# Patient Record
Sex: MALE | Race: WHITE
[De-identification: names, ages, dates, MRNs, and addresses within clinical notes are randomized per-mention and may not be internally consistent; named-entity substitution may affect disease eponyms.]

---

## 2018-09-17 PROBLEM — Z00.00 ENCOUNTER FOR PREVENTIVE HEALTH EXAMINATION: Status: ACTIVE | Noted: 2018-09-17

## 2018-09-19 ENCOUNTER — APPOINTMENT (OUTPATIENT)
Dept: VASCULAR SURGERY | Facility: CLINIC | Age: 68
End: 2018-09-19
Payer: MEDICARE

## 2018-09-19 VITALS — BODY MASS INDEX: 27.83 KG/M2 | WEIGHT: 210 LBS | HEIGHT: 73 IN

## 2018-09-19 VITALS — OXYGEN SATURATION: 98 % | HEART RATE: 60 BPM | DIASTOLIC BLOOD PRESSURE: 68 MMHG | SYSTOLIC BLOOD PRESSURE: 152 MMHG

## 2018-09-19 DIAGNOSIS — Z86.39 PERSONAL HISTORY OF OTHER ENDOCRINE, NUTRITIONAL AND METABOLIC DISEASE: ICD-10-CM

## 2018-09-19 DIAGNOSIS — M79.606 PAIN IN LEG, UNSPECIFIED: ICD-10-CM

## 2018-09-19 DIAGNOSIS — Z86.79 PERSONAL HISTORY OF OTHER DISEASES OF THE CIRCULATORY SYSTEM: ICD-10-CM

## 2018-09-19 DIAGNOSIS — Z87.448 PERSONAL HISTORY OF OTHER DISEASES OF URINARY SYSTEM: ICD-10-CM

## 2018-09-19 PROCEDURE — 93923 UPR/LXTR ART STDY 3+ LVLS: CPT

## 2018-09-19 PROCEDURE — 99204 OFFICE O/P NEW MOD 45 MIN: CPT

## 2018-09-20 RX ORDER — INSULIN DETEMIR 100 [IU]/ML
100 INJECTION, SOLUTION SUBCUTANEOUS
Refills: 0 | Status: ACTIVE | COMMUNITY

## 2018-09-20 RX ORDER — ATORVASTATIN CALCIUM 20 MG/1
20 TABLET, FILM COATED ORAL
Refills: 0 | Status: ACTIVE | COMMUNITY

## 2018-09-20 RX ORDER — POLYETHYLENE GLYCOL-3350 AND ELECTROLYTES 236; 6.74; 5.86; 2.97; 22.74 G/274.31G; G/274.31G; G/274.31G; G/274.31G; G/274.31G
236 POWDER, FOR SOLUTION ORAL
Qty: 4000 | Refills: 0 | Status: ACTIVE | COMMUNITY
Start: 2018-09-12

## 2018-09-20 RX ORDER — ASPIRIN 81 MG
81 TABLET, DELAYED RELEASE (ENTERIC COATED) ORAL
Refills: 0 | Status: ACTIVE | COMMUNITY

## 2018-09-20 RX ORDER — PRAZOSIN HYDROCHLORIDE 2 MG/1
2 CAPSULE ORAL
Qty: 30 | Refills: 0 | Status: ACTIVE | COMMUNITY
Start: 2018-07-11

## 2018-09-20 RX ORDER — AMLODIPINE BESYLATE 10 MG/1
10 TABLET ORAL
Refills: 0 | Status: ACTIVE | COMMUNITY

## 2018-09-20 RX ORDER — APIXABAN 5 MG/1
TABLET, FILM COATED ORAL
Refills: 0 | Status: ACTIVE | COMMUNITY

## 2018-09-20 RX ORDER — CARVEDILOL 25 MG/1
25 TABLET, FILM COATED ORAL
Refills: 0 | Status: ACTIVE | COMMUNITY

## 2018-11-14 ENCOUNTER — APPOINTMENT (OUTPATIENT)
Dept: VASCULAR SURGERY | Facility: CLINIC | Age: 68
End: 2018-11-14

## 2019-11-07 ENCOUNTER — OUTPATIENT (OUTPATIENT)
Dept: OUTPATIENT SERVICES | Facility: HOSPITAL | Age: 69
LOS: 1 days | Discharge: HOME | End: 2019-11-07

## 2019-11-08 DIAGNOSIS — E11.9 TYPE 2 DIABETES MELLITUS WITHOUT COMPLICATIONS: ICD-10-CM

## 2019-11-08 DIAGNOSIS — N18.5 CHRONIC KIDNEY DISEASE, STAGE 5: ICD-10-CM

## 2019-11-08 DIAGNOSIS — I10 ESSENTIAL (PRIMARY) HYPERTENSION: ICD-10-CM

## 2021-07-28 ENCOUNTER — APPOINTMENT (OUTPATIENT)
Dept: CARDIOLOGY | Facility: CLINIC | Age: 71
End: 2021-07-28

## 2023-07-12 ENCOUNTER — APPOINTMENT (OUTPATIENT)
Dept: VASCULAR SURGERY | Facility: CLINIC | Age: 73
End: 2023-07-12

## 2024-04-09 ENCOUNTER — INPATIENT (INPATIENT)
Facility: HOSPITAL | Age: 74
LOS: 2 days | Discharge: HOME HEALTH | DRG: 629 | End: 2024-04-12
Attending: INTERNAL MEDICINE | Admitting: HOSPITALIST
Payer: MEDICARE

## 2024-04-09 VITALS
TEMPERATURE: 97 F | SYSTOLIC BLOOD PRESSURE: 134 MMHG | RESPIRATION RATE: 20 BRPM | HEART RATE: 96 BPM | WEIGHT: 210.1 LBS | DIASTOLIC BLOOD PRESSURE: 68 MMHG | OXYGEN SATURATION: 97 %

## 2024-04-09 DIAGNOSIS — Z95.1 PRESENCE OF AORTOCORONARY BYPASS GRAFT: Chronic | ICD-10-CM

## 2024-04-09 DIAGNOSIS — S91.309A UNSPECIFIED OPEN WOUND, UNSPECIFIED FOOT, INITIAL ENCOUNTER: ICD-10-CM

## 2024-04-09 DIAGNOSIS — Z95.810 PRESENCE OF AUTOMATIC (IMPLANTABLE) CARDIAC DEFIBRILLATOR: Chronic | ICD-10-CM

## 2024-04-09 DIAGNOSIS — Z94.0 KIDNEY TRANSPLANT STATUS: Chronic | ICD-10-CM

## 2024-04-09 LAB
ALBUMIN SERPL ELPH-MCNC: 4.3 G/DL — SIGNIFICANT CHANGE UP (ref 3.5–5.2)
ALP SERPL-CCNC: 89 U/L — SIGNIFICANT CHANGE UP (ref 30–115)
ALT FLD-CCNC: 14 U/L — SIGNIFICANT CHANGE UP (ref 0–41)
ANION GAP SERPL CALC-SCNC: 12 MMOL/L — SIGNIFICANT CHANGE UP (ref 7–14)
APTT BLD: 37.2 SEC — SIGNIFICANT CHANGE UP (ref 27–39.2)
AST SERPL-CCNC: 20 U/L — SIGNIFICANT CHANGE UP (ref 0–41)
BASOPHILS # BLD AUTO: 0.02 K/UL — SIGNIFICANT CHANGE UP (ref 0–0.2)
BASOPHILS NFR BLD AUTO: 0.1 % — SIGNIFICANT CHANGE UP (ref 0–1)
BILIRUB SERPL-MCNC: 0.3 MG/DL — SIGNIFICANT CHANGE UP (ref 0.2–1.2)
BLD GP AB SCN SERPL QL: SIGNIFICANT CHANGE UP
BUN SERPL-MCNC: 19 MG/DL — SIGNIFICANT CHANGE UP (ref 10–20)
CALCIUM SERPL-MCNC: 9.2 MG/DL — SIGNIFICANT CHANGE UP (ref 8.4–10.5)
CHLORIDE SERPL-SCNC: 103 MMOL/L — SIGNIFICANT CHANGE UP (ref 98–110)
CO2 SERPL-SCNC: 23 MMOL/L — SIGNIFICANT CHANGE UP (ref 17–32)
CREAT SERPL-MCNC: 1.2 MG/DL — SIGNIFICANT CHANGE UP (ref 0.7–1.5)
EGFR: 65 ML/MIN/1.73M2 — SIGNIFICANT CHANGE UP
EOSINOPHIL # BLD AUTO: 0.11 K/UL — SIGNIFICANT CHANGE UP (ref 0–0.7)
EOSINOPHIL NFR BLD AUTO: 0.7 % — SIGNIFICANT CHANGE UP (ref 0–8)
GLUCOSE SERPL-MCNC: 213 MG/DL — HIGH (ref 70–99)
HCT VFR BLD CALC: 43.3 % — SIGNIFICANT CHANGE UP (ref 42–52)
HGB BLD-MCNC: 13.3 G/DL — LOW (ref 14–18)
IMM GRANULOCYTES NFR BLD AUTO: 0.3 % — SIGNIFICANT CHANGE UP (ref 0.1–0.3)
INR BLD: 1 RATIO — SIGNIFICANT CHANGE UP (ref 0.65–1.3)
LYMPHOCYTES # BLD AUTO: 18.8 % — LOW (ref 20.5–51.1)
LYMPHOCYTES # BLD AUTO: 2.77 K/UL — SIGNIFICANT CHANGE UP (ref 1.2–3.4)
MCHC RBC-ENTMCNC: 23.9 PG — LOW (ref 27–31)
MCHC RBC-ENTMCNC: 30.7 G/DL — LOW (ref 32–37)
MCV RBC AUTO: 77.7 FL — LOW (ref 80–94)
MONOCYTES # BLD AUTO: 0.82 K/UL — HIGH (ref 0.1–0.6)
MONOCYTES NFR BLD AUTO: 5.6 % — SIGNIFICANT CHANGE UP (ref 1.7–9.3)
NEUTROPHILS # BLD AUTO: 10.97 K/UL — HIGH (ref 1.4–6.5)
NEUTROPHILS NFR BLD AUTO: 74.5 % — SIGNIFICANT CHANGE UP (ref 42.2–75.2)
NRBC # BLD: 0 /100 WBCS — SIGNIFICANT CHANGE UP (ref 0–0)
PLATELET # BLD AUTO: 250 K/UL — SIGNIFICANT CHANGE UP (ref 130–400)
PMV BLD: 10 FL — SIGNIFICANT CHANGE UP (ref 7.4–10.4)
POTASSIUM SERPL-MCNC: 4.9 MMOL/L — SIGNIFICANT CHANGE UP (ref 3.5–5)
POTASSIUM SERPL-SCNC: 4.9 MMOL/L — SIGNIFICANT CHANGE UP (ref 3.5–5)
PROT SERPL-MCNC: 6.7 G/DL — SIGNIFICANT CHANGE UP (ref 6–8)
PROTHROM AB SERPL-ACNC: 11.4 SEC — SIGNIFICANT CHANGE UP (ref 9.95–12.87)
RBC # BLD: 5.57 M/UL — SIGNIFICANT CHANGE UP (ref 4.7–6.1)
RBC # FLD: 18.9 % — HIGH (ref 11.5–14.5)
SODIUM SERPL-SCNC: 138 MMOL/L — SIGNIFICANT CHANGE UP (ref 135–146)
WBC # BLD: 14.74 K/UL — HIGH (ref 4.8–10.8)
WBC # FLD AUTO: 14.74 K/UL — HIGH (ref 4.8–10.8)

## 2024-04-09 PROCEDURE — 97162 PT EVAL MOD COMPLEX 30 MIN: CPT | Mod: GP

## 2024-04-09 PROCEDURE — 73630 X-RAY EXAM OF FOOT: CPT | Mod: 26,LT

## 2024-04-09 PROCEDURE — 93925 LOWER EXTREMITY STUDY: CPT

## 2024-04-09 PROCEDURE — 99222 1ST HOSP IP/OBS MODERATE 55: CPT

## 2024-04-09 PROCEDURE — 85025 COMPLETE CBC W/AUTO DIFF WBC: CPT

## 2024-04-09 PROCEDURE — 87070 CULTURE OTHR SPECIMN AEROBIC: CPT

## 2024-04-09 PROCEDURE — 71045 X-RAY EXAM CHEST 1 VIEW: CPT | Mod: 26

## 2024-04-09 PROCEDURE — 87077 CULTURE AEROBIC IDENTIFY: CPT

## 2024-04-09 PROCEDURE — 88307 TISSUE EXAM BY PATHOLOGIST: CPT

## 2024-04-09 PROCEDURE — 82962 GLUCOSE BLOOD TEST: CPT

## 2024-04-09 PROCEDURE — 85652 RBC SED RATE AUTOMATED: CPT

## 2024-04-09 PROCEDURE — 86140 C-REACTIVE PROTEIN: CPT

## 2024-04-09 PROCEDURE — 36415 COLL VENOUS BLD VENIPUNCTURE: CPT

## 2024-04-09 PROCEDURE — 87075 CULTR BACTERIA EXCEPT BLOOD: CPT

## 2024-04-09 PROCEDURE — 87186 SC STD MICRODIL/AGAR DIL: CPT

## 2024-04-09 PROCEDURE — 93925 LOWER EXTREMITY STUDY: CPT | Mod: 26

## 2024-04-09 PROCEDURE — 80048 BASIC METABOLIC PNL TOTAL CA: CPT

## 2024-04-09 PROCEDURE — 99285 EMERGENCY DEPT VISIT HI MDM: CPT

## 2024-04-09 PROCEDURE — 93010 ELECTROCARDIOGRAM REPORT: CPT

## 2024-04-09 PROCEDURE — 88311 DECALCIFY TISSUE: CPT

## 2024-04-09 RX ORDER — METOPROLOL TARTRATE 50 MG
50 TABLET ORAL DAILY
Refills: 0 | Status: DISCONTINUED | OUTPATIENT
Start: 2024-04-09 | End: 2024-04-10

## 2024-04-09 RX ORDER — LANOLIN ALCOHOL/MO/W.PET/CERES
3 CREAM (GRAM) TOPICAL AT BEDTIME
Refills: 0 | Status: DISCONTINUED | OUTPATIENT
Start: 2024-04-09 | End: 2024-04-10

## 2024-04-09 RX ORDER — SODIUM CHLORIDE 9 MG/ML
1000 INJECTION, SOLUTION INTRAVENOUS
Refills: 0 | Status: DISCONTINUED | OUTPATIENT
Start: 2024-04-09 | End: 2024-04-10

## 2024-04-09 RX ORDER — INSULIN LISPRO 100/ML
VIAL (ML) SUBCUTANEOUS
Refills: 0 | Status: DISCONTINUED | OUTPATIENT
Start: 2024-04-09 | End: 2024-04-10

## 2024-04-09 RX ORDER — DEXTROSE 50 % IN WATER 50 %
25 SYRINGE (ML) INTRAVENOUS ONCE
Refills: 0 | Status: DISCONTINUED | OUTPATIENT
Start: 2024-04-09 | End: 2024-04-10

## 2024-04-09 RX ORDER — RANOLAZINE 500 MG/1
1 TABLET, FILM COATED, EXTENDED RELEASE ORAL
Refills: 0 | DISCHARGE

## 2024-04-09 RX ORDER — RANOLAZINE 500 MG/1
500 TABLET, FILM COATED, EXTENDED RELEASE ORAL
Refills: 0 | Status: DISCONTINUED | OUTPATIENT
Start: 2024-04-09 | End: 2024-04-10

## 2024-04-09 RX ORDER — ATORVASTATIN CALCIUM 80 MG/1
20 TABLET, FILM COATED ORAL AT BEDTIME
Refills: 0 | Status: DISCONTINUED | OUTPATIENT
Start: 2024-04-09 | End: 2024-04-10

## 2024-04-09 RX ORDER — GLUCAGON INJECTION, SOLUTION 0.5 MG/.1ML
1 INJECTION, SOLUTION SUBCUTANEOUS ONCE
Refills: 0 | Status: DISCONTINUED | OUTPATIENT
Start: 2024-04-09 | End: 2024-04-10

## 2024-04-09 RX ORDER — MYCOPHENOLATE MOFETIL 250 MG/1
500 CAPSULE ORAL
Refills: 0 | Status: DISCONTINUED | OUTPATIENT
Start: 2024-04-09 | End: 2024-04-10

## 2024-04-09 RX ORDER — ISOSORBIDE MONONITRATE 60 MG/1
30 TABLET, EXTENDED RELEASE ORAL DAILY
Refills: 0 | Status: DISCONTINUED | OUTPATIENT
Start: 2024-04-09 | End: 2024-04-10

## 2024-04-09 RX ORDER — DEXTROSE 10 % IN WATER 10 %
125 INTRAVENOUS SOLUTION INTRAVENOUS ONCE
Refills: 0 | Status: DISCONTINUED | OUTPATIENT
Start: 2024-04-09 | End: 2024-04-10

## 2024-04-09 RX ORDER — FOLIC ACID 0.8 MG
1 TABLET ORAL DAILY
Refills: 0 | Status: DISCONTINUED | OUTPATIENT
Start: 2024-04-09 | End: 2024-04-10

## 2024-04-09 RX ORDER — DEXTROSE 50 % IN WATER 50 %
12.5 SYRINGE (ML) INTRAVENOUS ONCE
Refills: 0 | Status: DISCONTINUED | OUTPATIENT
Start: 2024-04-09 | End: 2024-04-10

## 2024-04-09 RX ORDER — INSULIN GLARGINE 100 [IU]/ML
30 INJECTION, SOLUTION SUBCUTANEOUS
Refills: 0 | DISCHARGE

## 2024-04-09 RX ORDER — CLOPIDOGREL BISULFATE 75 MG/1
0 TABLET, FILM COATED ORAL
Qty: 0 | Refills: 3 | DISCHARGE

## 2024-04-09 RX ORDER — MYCOPHENOLATE MOFETIL 250 MG/1
1 CAPSULE ORAL
Refills: 0 | DISCHARGE

## 2024-04-09 RX ORDER — INFLUENZA VIRUS VACCINE 15; 15; 15; 15 UG/.5ML; UG/.5ML; UG/.5ML; UG/.5ML
0.7 SUSPENSION INTRAMUSCULAR ONCE
Refills: 0 | Status: DISCONTINUED | OUTPATIENT
Start: 2024-04-09 | End: 2024-04-12

## 2024-04-09 RX ORDER — NIFEDIPINE 30 MG
1 TABLET, EXTENDED RELEASE 24 HR ORAL
Refills: 0 | DISCHARGE

## 2024-04-09 RX ORDER — ISOSORBIDE MONONITRATE 60 MG/1
1 TABLET, EXTENDED RELEASE ORAL
Refills: 0 | DISCHARGE

## 2024-04-09 RX ORDER — SERTRALINE 25 MG/1
50 TABLET, FILM COATED ORAL DAILY
Refills: 0 | Status: DISCONTINUED | OUTPATIENT
Start: 2024-04-09 | End: 2024-04-10

## 2024-04-09 RX ORDER — TACROLIMUS 5 MG/1
1 CAPSULE ORAL
Refills: 0 | Status: DISCONTINUED | OUTPATIENT
Start: 2024-04-09 | End: 2024-04-10

## 2024-04-09 RX ORDER — PREDNISOLONE 5 MG
1 TABLET ORAL
Refills: 0 | DISCHARGE

## 2024-04-09 RX ORDER — APIXABAN 2.5 MG/1
5 TABLET, FILM COATED ORAL EVERY 12 HOURS
Refills: 0 | Status: DISCONTINUED | OUTPATIENT
Start: 2024-04-09 | End: 2024-04-09

## 2024-04-09 RX ORDER — TACROLIMUS 5 MG/1
1 CAPSULE ORAL
Refills: 0 | DISCHARGE

## 2024-04-09 RX ORDER — SODIUM CHLORIDE 9 MG/ML
1000 INJECTION, SOLUTION INTRAVENOUS
Refills: 0 | Status: DISCONTINUED | OUTPATIENT
Start: 2024-04-09 | End: 2024-04-11

## 2024-04-09 RX ORDER — FAMOTIDINE 10 MG/ML
20 INJECTION INTRAVENOUS ONCE
Refills: 0 | Status: COMPLETED | OUTPATIENT
Start: 2024-04-09 | End: 2024-04-09

## 2024-04-09 RX ORDER — ISOSORBIDE DINITRATE 5 MG/1
1 TABLET ORAL
Refills: 0 | DISCHARGE

## 2024-04-09 RX ORDER — ASPIRIN/CALCIUM CARB/MAGNESIUM 324 MG
81 TABLET ORAL DAILY
Refills: 0 | Status: DISCONTINUED | OUTPATIENT
Start: 2024-04-09 | End: 2024-04-10

## 2024-04-09 RX ORDER — INSULIN GLARGINE 100 [IU]/ML
30 INJECTION, SOLUTION SUBCUTANEOUS AT BEDTIME
Refills: 0 | Status: DISCONTINUED | OUTPATIENT
Start: 2024-04-09 | End: 2024-04-10

## 2024-04-09 RX ORDER — NIFEDIPINE 30 MG
30 TABLET, EXTENDED RELEASE 24 HR ORAL
Refills: 0 | Status: DISCONTINUED | OUTPATIENT
Start: 2024-04-09 | End: 2024-04-10

## 2024-04-09 RX ORDER — DAPAGLIFLOZIN 10 MG/1
0 TABLET, FILM COATED ORAL
Qty: 0 | Refills: 0 | DISCHARGE

## 2024-04-09 RX ORDER — DEXTROSE 50 % IN WATER 50 %
15 SYRINGE (ML) INTRAVENOUS ONCE
Refills: 0 | Status: DISCONTINUED | OUTPATIENT
Start: 2024-04-09 | End: 2024-04-10

## 2024-04-09 RX ORDER — INSULIN LISPRO 100/ML
VIAL (ML) SUBCUTANEOUS AT BEDTIME
Refills: 0 | Status: DISCONTINUED | OUTPATIENT
Start: 2024-04-09 | End: 2024-04-10

## 2024-04-09 RX ORDER — FOLIC ACID 0.8 MG
1 TABLET ORAL
Refills: 0 | DISCHARGE

## 2024-04-09 RX ORDER — DAPAGLIFLOZIN 10 MG/1
10 TABLET, FILM COATED ORAL EVERY 24 HOURS
Refills: 0 | Status: DISCONTINUED | OUTPATIENT
Start: 2024-04-09 | End: 2024-04-10

## 2024-04-09 RX ADMIN — ATORVASTATIN CALCIUM 20 MILLIGRAM(S): 80 TABLET, FILM COATED ORAL at 21:51

## 2024-04-09 RX ADMIN — INSULIN GLARGINE 30 UNIT(S): 100 INJECTION, SOLUTION SUBCUTANEOUS at 22:27

## 2024-04-09 RX ADMIN — TACROLIMUS 1 MILLIGRAM(S): 5 CAPSULE ORAL at 17:40

## 2024-04-09 RX ADMIN — Medication 1 TABLET(S): at 17:40

## 2024-04-09 RX ADMIN — FAMOTIDINE 20 MILLIGRAM(S): 10 INJECTION INTRAVENOUS at 08:51

## 2024-04-09 RX ADMIN — Medication 30 MILLIGRAM(S): at 17:40

## 2024-04-09 RX ADMIN — Medication 2: at 17:58

## 2024-04-09 RX ADMIN — DAPAGLIFLOZIN 10 MILLIGRAM(S): 10 TABLET, FILM COATED ORAL at 17:40

## 2024-04-09 RX ADMIN — Medication 3 MILLIGRAM(S): at 21:51

## 2024-04-09 RX ADMIN — RANOLAZINE 500 MILLIGRAM(S): 500 TABLET, FILM COATED, EXTENDED RELEASE ORAL at 17:39

## 2024-04-09 RX ADMIN — MYCOPHENOLATE MOFETIL 500 MILLIGRAM(S): 250 CAPSULE ORAL at 17:39

## 2024-04-09 NOTE — ED PROVIDER NOTE - PHYSICAL EXAMINATION
VITAL SIGNS: I have reviewed nursing notes and confirm.  CONSTITUTIONAL: well-appearing, non-toxic, NAD  SKIN: Warm dry, normal skin turgor  HEAD: NCAT  EYES: EOMI, PERRLA, no scleral icterus  ENT: Moist mucous membranes, normal pharynx with no erythema or exudates  NECK: Supple; non tender. Full ROM. No cervical LAD  CARD: RRR, no murmurs, rubs or gallops  RESP: clear to ausculation b/l.  No rales, rhonchi, or wheezing.  ABD: soft, + BS, non-tender, non-distended, no rebound or guarding. No CVA tenderness  EXT: Full ROM, no bony tenderness, left big toe with partial amputation, erythema and edema present with slight expressable purulent discharge, Pulses intact bilateral UE and LE, no pedal edema, no calf tenderness  NEURO: normal motor. normal sensory. CN II-XII intact. Cerebellar testing normal. Normal gait.  PSYCH: Cooperative, appropriate.

## 2024-04-09 NOTE — CONSULT NOTE ADULT - SUBJECTIVE AND OBJECTIVE BOX
Podiatry Consult Note    Subjective:  NEMO ALLEN is a pleasant well-nourished, well developed 73y Male in no acute distress, alert awake, and oriented to person, place and time.   Patient is a 73y old  Male who presents with a chief complaint of L big toe redness & swelling since December 2023. Went to [facility] in [place], was given IV abx and DSC with PO abx.  States redness/drainage comes and goes. States wife drained a lot of pus from toe yesterday.  Saw Dr. Dumont as o/p yesterday who told pt to come to ED this AM for admission and possible surgery tonight.  Reports mild pain to L big toe. Denies N/V/F/C. No other pedal complaints.   HPI:    PAST MEDICAL & SURGICAL HISTORY:    Objective:  Vital Signs Last 24 Hrs  T(C): 36 (09 Apr 2024 07:55), Max: 36 (09 Apr 2024 07:55)  T(F): 96.8 (09 Apr 2024 07:55), Max: 96.8 (09 Apr 2024 07:55)  HR: 96 (09 Apr 2024 07:55) (96 - 96)  BP: 134/68 (09 Apr 2024 07:55) (134/68 - 134/68)  BP(mean): --  RR: 20 (09 Apr 2024 07:55) (20 - 20)  SpO2: 97% (09 Apr 2024 07:55) (97% - 97%)    Parameters below as of 09 Apr 2024 07:55  Patient On (Oxygen Delivery Method): room air                            13.3   14.74 )-----------( 250      ( 09 Apr 2024 08:10 )             43.3                           Physical Exam - Lower Extremity Focused:   #Left   Derm:        Podiatry Consult Note    Subjective:  NEMO ALLEN is a pleasant well-nourished, well developed 73y Male in no acute distress, alert awake, and oriented to person, place and time.   Patient is a 73y old  Male who presents with a chief complaint of L big toe redness & swelling since December 2023. Went to [facility] in [place], was given IV abx and DSC with PO abx.  States redness/drainage comes and goes. States wife drained a lot of pus from toe yesterday.  Saw Dr. Dumont as o/p yesterday who told pt to come to ED this AM for admission and possible surgery tonight.  Reports mild pain to L big toe. Denies N/V/F/C. No other pedal complaints.   HPI:    PAST MEDICAL & SURGICAL HISTORY:    Objective:  Vital Signs Last 24 Hrs  T(C): 36 (09 Apr 2024 07:55), Max: 36 (09 Apr 2024 07:55)  T(F): 96.8 (09 Apr 2024 07:55), Max: 96.8 (09 Apr 2024 07:55)  HR: 96 (09 Apr 2024 07:55) (96 - 96)  BP: 134/68 (09 Apr 2024 07:55) (134/68 - 134/68)  BP(mean): --  RR: 20 (09 Apr 2024 07:55) (20 - 20)  SpO2: 97% (09 Apr 2024 07:55) (97% - 97%)    Parameters below as of 09 Apr 2024 07:55  Patient On (Oxygen Delivery Method): room air                            13.3   14.74 )-----------( 250      ( 09 Apr 2024 08:10 )             43.3                           Physical Exam - Lower Extremity Focused:   #Left LE  Derm:   Open distal hallux wound    -Wound Probes skin w/ no active drainage/bleeding   -Mild Cellulitis w/ Erythema of Left hallux   Toe is warm, capillary refill is instant to the toes     Vascular: DP and PT Pulses Diminished; Foot is Warm to Warm to the touch   Neuro: Protective Sensation Diminished / Moderately Neuropathic   MSK: Partial hallux amputation. 2nd toe amputation. Pain On Palpation at Wound Site. 5/5 MMT all cpts.      Assessment:  Left Hallux abscess   Left Hallux osteomyelitis   Cellulitic Left Lower Extremity     Plan:  Chart reviewed and Patient evaluated. All Questions and Concerns Addressed and Answered  Discussed diagnosis and treatment with patient  Wound Flushed w/ NS; Wound dressed w/ Xeroform / DSD / Kerlix   Local Wound Care; As Stated Above   No Wound Culture Obtained; No appreciable drainage bedside  XR Imaging Left Foot; Performed; f/u report;   Lower Extremity Arterial Duplex B/L;   ESR/CRP;   Request ID Consult; WBC 14.74 / Follow Up w/ OR Cx/Path  OR as Add-on #4 tonight 4/9; Excisional debridement of soft tissue & bone, Left foot  *** Request Medical Clearance ***  NPO STAT  Request pre-lab optimization & OR stratification prior to sx  Discussed Plan w/ Dr. Dumont    Podiatry            Podiatry Consult Note    Subjective:  NEMO ALLEN is a pleasant well-nourished, well developed 73y Male in no acute distress, alert awake, and oriented to person, place and time.   Patient is a 73y old  Male who presents with a chief complaint of L big toe redness & swelling since December 2023. Went to [facility] in [place], was given IV abx and DSC with PO abx.  States redness/drainage comes and goes. States wife drained a lot of pus from toe yesterday.  Saw Dr. Dumont as o/p yesterday who told pt to come to ED this AM for admission and possible surgery tonight.  Reports mild pain to L big toe. Denies N/V/F/C. No other pedal complaints.   HPI:    PAST MEDICAL & SURGICAL HISTORY:    Objective:  Vital Signs Last 24 Hrs  T(C): 36 (09 Apr 2024 07:55), Max: 36 (09 Apr 2024 07:55)  T(F): 96.8 (09 Apr 2024 07:55), Max: 96.8 (09 Apr 2024 07:55)  HR: 96 (09 Apr 2024 07:55) (96 - 96)  BP: 134/68 (09 Apr 2024 07:55) (134/68 - 134/68)  BP(mean): --  RR: 20 (09 Apr 2024 07:55) (20 - 20)  SpO2: 97% (09 Apr 2024 07:55) (97% - 97%)    Parameters below as of 09 Apr 2024 07:55  Patient On (Oxygen Delivery Method): room air                            13.3   14.74 )-----------( 250      ( 09 Apr 2024 08:10 )             43.3                           Physical Exam - Lower Extremity Focused:   #Left LE  Derm:   Open distal hallux wound    -Wound Probes skin w/ no active drainage/bleeding   -Mild Cellulitis w/ Erythema of Left hallux   Toe is warm, capillary refill is instant to the toes     Vascular: DP and PT Pulses Diminished; Foot is Warm to Warm to the touch   Neuro: Protective Sensation Diminished / Moderately Neuropathic   MSK: Partial hallux amputation. 2nd toe amputation. Pain On Palpation at Wound Site. 5/5 MMT all cpts.      Assessment:  Left Hallux abscess   Left Hallux osteomyelitis   Cellulitic Left Lower Extremity     Plan:  Chart reviewed and Patient evaluated. All Questions and Concerns Addressed and Answered  Discussed diagnosis and treatment with patient  Wound Flushed w/ NS; Wound dressed w/ Xeroform / DSD / Kerlix   Local Wound Care; As Stated Above   No Wound Culture Obtained; No appreciable drainage bedside  XR Imaging Left Foot; Performed; f/u report;   Lower Extremity Arterial Duplex B/L; if abnormal please consult Vascular;  ESR/CRP;   Request ID Consult; WBC 14.74 / Follow Up w/ OR Cx/Path  OR as Add-on #4 tonight 4/9; Excisional debridement of soft tissue & bone, Left foot  *** Request Medical Clearance ***  NPO STAT  Request pre-lab optimization & OR stratification prior to sx  Discussed Plan w/ Dr. Dumont    Podiatry

## 2024-04-09 NOTE — H&P ADULT - NSHPLABSRESULTS_GEN_ALL_CORE
.  LABS:                         13.3   14.74 )-----------( 250      ( 09 Apr 2024 08:10 )             43.3     04-09    138  |  103  |  19  ----------------------------<  213<H>  4.9   |  23  |  1.2    Ca    9.2      09 Apr 2024 08:10    TPro  6.7  /  Alb  4.3  /  TBili  0.3  /  DBili  x   /  AST  20  /  ALT  14  /  AlkPhos  89  04-09    PT/INR - ( 09 Apr 2024 08:10 )   PT: 11.40 sec;   INR: 1.00 ratio         PTT - ( 09 Apr 2024 08:10 )  PTT:37.2 sec  Urinalysis Basic - ( 09 Apr 2024 08:10 )    Color: x / Appearance: x / SG: x / pH: x  Gluc: 213 mg/dL / Ketone: x  / Bili: x / Urobili: x   Blood: x / Protein: x / Nitrite: x   Leuk Esterase: x / RBC: x / WBC x   Sq Epi: x / Non Sq Epi: x / Bacteria: x            RADIOLOGY, EKG & ADDITIONAL TESTS: Reviewed. .  LABS:                         13.3   14.74 )-----------( 250      ( 09 Apr 2024 08:10 )             43.3     04-09    138  |  103  |  19  ----------------------------<  213<H>  4.9   |  23  |  1.2    Ca    9.2      09 Apr 2024 08:10    TPro  6.7  /  Alb  4.3  /  TBili  0.3  /  DBili  x   /  AST  20  /  ALT  14  /  AlkPhos  89  04-09    PT/INR - ( 09 Apr 2024 08:10 )   PT: 11.40 sec;   INR: 1.00 ratio         PTT - ( 09 Apr 2024 08:10 )  PTT:37.2 sec  Urinalysis Basic - ( 09 Apr 2024 08:10 )    Color: x / Appearance: x / SG: x / pH: x  Gluc: 213 mg/dL / Ketone: x  / Bili: x / Urobili: x   Blood: x / Protein: x / Nitrite: x   Leuk Esterase: x / RBC: x / WBC x   Sq Epi: x / Non Sq Epi: x / Bacteria: x

## 2024-04-09 NOTE — ED PROVIDER NOTE - OBJECTIVE STATEMENT
73y male with PMHx of CAD s/p CABD, IDDM, chronic foot wounds, HTN, h/o of kidney transplant who presents for left big toe foot wound. 73-year-old male with past medical history of complaining of worsening pus drainage from his left big toe for the past few months.  Saw podiatrist Dr. Fernandes outpatient, who recommends he go to the ER for admission for surgery. Currently, patient denies fevers, chills, CP, shortness of breath, nausea, vomiting, abdominal pain, weakness, numbness, tingling. 73y male with PMHx of CAD s/p CABG, IDDM, chronic foot wounds, HTN, h/o of kidney transplant who presents for left big toe foot wound. 73-year-old male with past medical history of complaining of worsening pus drainage from his left big toe for the past few months.  Saw podiatrist Dr. Fernandes outpatient, who recommends he go to the ER for admission for surgery. Currently, patient denies fevers, chills, CP, shortness of breath, nausea, vomiting, abdominal pain, weakness, numbness, tingling.

## 2024-04-09 NOTE — PATIENT PROFILE ADULT - FALL HARM RISK - HARM RISK INTERVENTIONS

## 2024-04-09 NOTE — PATIENT PROFILE ADULT - FUNCTIONAL ASSESSMENT - BASIC MOBILITY 6.
4-calculated by average/Not able to assess (calculate score using [facility] averaging method)

## 2024-04-09 NOTE — H&P ADULT - NSICDXPASTMEDICALHX_GEN_ALL_CORE_FT
PAST MEDICAL HISTORY:  CAD (coronary artery disease)     HF (heart failure)     History of renal transplant     HLD (hyperlipidemia)     Hypertension

## 2024-04-09 NOTE — CHART NOTE - NSCHARTNOTEFT_GEN_A_CORE
Podiatry aware of patient will see shortly.
Since patient is NPO for procedure tomorrow, will give only 15 units of Lantus tonight (half of his usual dose)
procedure for today cancelled due to OR availability.     Patient will be added on tomorrow 4/10 for OR debridement.  Please have NPO midnight

## 2024-04-09 NOTE — H&P ADULT - NSICDXPASTSURGICALHX_GEN_ALL_CORE_FT
PAST SURGICAL HISTORY:  H/O kidney transplant     History of cardiac defibrillator placement     S/P CABG (coronary artery bypass graft)

## 2024-04-09 NOTE — H&P ADULT - ASSESSMENT
72 y/o M PMHx  DM, HTN, HF,  h/o L renal transplant ( on prograf, cellcept, and prednisone, defribillator 2/2 Afib, HLD c/o 6 month h/o L foot pain w/ pain worst within past day. Pt reports  h/o had similar sxs in Dec '23 ( pt admitted for L foot infection at [facility] & tx'ed w/ IV abx). pt reports he saw Dr. Loera for current sxs, has been taking e-rxed Augmentin since yesterday, and advised pt to go to ER if pain sxs got worst.     Pt denies chest pain, numbness of L foot, recent trauma/injury of L foot, fever, chills, n/v      Plan  Admit    # Left foot pain w/ documented hx of L foot abscess  Podiatry recs: OR as Add-on #4 tonight 4/9; Excisional debridement of soft tissue & bone  - Medical Clearance  - keep NPO   -Request pre-lab optimization & OR stratification prior to sx  -f/u XR Imaging Left Foot  -f/u Lower Extremity Arterial Duplex B/L; if abnormal, consider Vascular consult)  -ID Consult;  -c/w PO abx for now ( per podiatrist provider ( [phone]) until seen by ID      #h/o Renal transplant  c/w prograf, prednisone 5mg QD, and cellcept when pt no longer NPO     #cardiac bypass  #HTN  #HLD  # defibrillator 2/2 Afib   - c/w Eliquis ( last dose yesterday am), Imdur, metoprolol, Nifedipine. Ranexa baby aspirin when pt no longer NPO     #DM  -c/w IVF and monitor FS while pt NPO   -would restart pt's home insulin regimen basal and ISS when pt no longer NPO       pt d/w Dr. Marrufo 74 y/o M PMHx  DM, HTN, HF,  h/o L renal transplant ( on prograf, cellcept, and prednisone,h/o cardia arrythmia s/p defibrillato, CAD s/p CABG, and  HLD c/o 6 month h/o L foot pain w/ pain worst within past day. Pt reports  h/o had similar sxs in Dec '23 ( pt admitted for L foot infection at [facility] & tx'ed w/ IV abx). pt reports he saw Dr. Loera for current sxs, has been taking e-rxed Augmentin since yesterday, and advised pt to go to ER if pain sxs got worst.     Pt denies chest pain, numbness of L foot, recent trauma/injury of L foot, fever, chills, n/v      Plan  Admit    # Left foot pain w/ documented hx of L foot abscess  - Podiatry recs: OR as Add-on #4 today  - will perform Medical Clearance for OR stratification  -keep NPO preop  -f/u CXR and ECG  -f/u Lower Extremity Arterial Duplex B/L; if abnormal, consider Vascular consult)  -ID Consult;  -c/w PO abx for now ( per podiatrist provider ( [phone]) until seen by ID      #h/o L Renal transplant  c/w prograf, prednisone 5mg QD, and cellcept     # CAD now s/p cardiac bypass  #HTN  #HLD  #arrythmia now s/p defibrillator   - c/w Imdur, metoprolol, Nifedipine. Ranexa baby aspirin; hold Eliquis ( last dose yesterday am) prior to OR    #DM  -c/w IVF and monitor FS while pt NPO   -would restart pt's home insulin regimen of 30 units long acting insulin QHS  and ISS when pt no longer NPO       - CHG bath  - Diet: NPO for OR today   - VTE ppx: hold eliquis preop; consider restarting subsequently if OK'ed by podiatry  - NWB of LLE; fall risks     pt d/w Dr. Marrufo 74 y/o M PMHx  DM, HTN, HF,  h/o L renal transplant ( on prograf, cellcept, and prednisone,h/o cardia arrythmia s/p defibrillato, CAD s/p CABG, and  HLD c/o 6 month h/o L foot pain w/ pain worst within past day. Pt reports  h/o had similar sxs in Dec '23 ( pt admitted for L foot infection at [facility] & tx'ed w/ IV abx). pt reports he saw Dr. Loera for current sxs, has been taking e-rxed Augmentin since yesterday, and advised pt to go to ER if pain sxs got worst.     Pt denies chest pain, numbness of L foot, recent trauma/injury of L foot, fever, chills, n/v      Plan  Admit    # Left foot pain w/ documented hx of L foot abscess  - Podiatry recs: OR as Add-on #4 today  - will perform Medical Clearance for OR stratification  -keep NPO preop  -f/u CXR and ECG  -f/u Lower Extremity Arterial Duplex B/L; if abnormal, consider Vascular consult)  -ID Consult;  -c/w PO abx for now ( per podiatrist provider ( [phone]) until seen by ID  -Pt is medically optimized for planned procedure      #h/o L Renal transplant  c/w prograf, prednisone 5mg QD, and cellcept     # CAD now s/p cardiac bypass  #HTN  #HLD  #arrythmia now s/p defibrillator   - c/w Imdur, metoprolol, Nifedipine. Ranexa baby aspirin; hold Eliquis ( last dose yesterday am) prior to OR    #DM  -c/w IVF and monitor FS while pt NPO   -would restart pt's home insulin regimen of 30 units long acting insulin QHS  and ISS when pt no longer NPO       - CHG bath  - Diet: NPO for OR today   - VTE ppx: hold eliquis preop; consider restarting subsequently if OK'ed by podiatry  - NWB of LLE; fall risks    74 y/o M PMHx  DM, HTN, HF,  h/o L renal transplant ( on prograf, cellcept, and prednisone,h/o cardia arrythmia s/p defibrillato, CAD s/p CABG, and  HLD c/o 6 month h/o L foot pain w/ pain worst within past day. Pt reports  h/o had similar sxs in Dec '23 ( pt admitted for L foot infection at [facility] & tx'ed w/ IV abx). pt reports he saw Dr. Loera for current sxs, has been taking e-rxed Augmentin since yesterday, and advised pt to go to ER if pain sxs got worst.     Pt denies chest pain, numbness of L foot, recent trauma/injury of L foot, fever, chills, n/v      Plan  Admit    # Left foot pain w/ documented hx of L foot abscess  - Podiatry recs: OR as Add-on #4 today  - will perform Medical Clearance for OR stratification  -keep NPO as preop  -f/u CXR and ECG  -f/u Lower Extremity Arterial Duplex B/L; (if abnormal, consider Vascular consult)  -ID Consult;  -c/w PO abx for now ( per podiatrist provider ( [phone]) until seen by ID  -Pt is medically optimized for planned procedure      #h/o L Renal transplant  c/w prograf, prednisone 5mg QD, and cellcept     # CAD now s/p cardiac bypass  #HTN  #HLD  #arrythmia now s/p defibrillator   - c/w Imdur, metoprolol, Nifedipine. Ranexa baby aspirin; hold Eliquis ( last dose yesterday am) prior to OR    #DM  -c/w IVF and monitor FS while pt NPO   -would restart pt's home insulin regimen of 30 units long acting insulin QHS  and ISS when pt no longer NPO       - CHG bath  - Diet: NPO for OR today   - VTE ppx: hold eliquis preop; consider restarting subsequently if OK'ed by podiatry  - NWB of LLE; fall risks

## 2024-04-09 NOTE — H&P ADULT - HISTORY OF PRESENT ILLNESS
73y old  Male           who presents with a chief complaint of L big toe redness & swelling since December 2023. Went to [facility] in [place], was given IV abx and DSC with PO abx.  States redness/drainage comes and goes. States wife drained a lot of pus from toe yesterday.  Saw Dr. Dumont as o/p yesterday who told pt to come to ED this AM for admission and possible surgery tonight.  Reports mild pain to L big toe. Denies N/V/F/C. No other pedal complaints.   HPI:   74 y/o M PMHx  DM, HTN, HF,  h/o L renal transplant ( on prograf, cellcept, and prednisone, defribillator 2/2 Afib, HLD c/o 6 month h/o L foot pain w/ pain worst within past day. Pt reports  h/o had similar sxs in Dec '23 ( pt admitted for L foot infection at [facility] & tx'ed w/ IV abx). pt reports he saw Dr. Loera for current sxs, has been taking e-rxed Augmentin since yesterday, and advised pt to go to ER if pain sxs got worst.  74 y/o M PMHx  DM, HTN, HF,  h/o L renal transplant ( on prograf, cellcept, and prednisone,h/o cardia arrythmia s/p defibrillato, CAD s/p CABG, and  HLD c/o 6 month h/o L foot pain w/ pain worst within past day. Pt reports  h/o had similar sxs in Dec '23 ( pt admitted for L foot infection at [facility] & tx'ed w/ IV abx). pt reports he saw Dr. Loera for current sxs, has been taking e-rxed Augmentin since yesterday, and advised pt to go to ER if pain sxs got worst.

## 2024-04-09 NOTE — H&P ADULT - NSHPPHYSICALEXAM_GEN_ALL_CORE
T(C): 36 (04-09-24 @ 07:55), Max: 36 (04-09-24 @ 07:55)  HR: 96 (04-09-24 @ 07:55) (96 - 96)  BP: 134/68 (04-09-24 @ 07:55) (134/68 - 134/68)  RR: 20 (04-09-24 @ 07:55) (20 - 20)  SpO2: 97% (04-09-24 @ 07:55) (97% - 97%)    PHYSICAL EXAM:  GENERAL: laying in bed, appearing comfortable and in NAD  HEENT: Moist mucous membranes  NECK: Supple  CHEST/LUNG: even respirations b/l; no accessory muscle use  ABDOMEN: Soft, Nontender, Nondistended  EXTREMITIES:   No calf TTP b/l; gauze dressing overlying L foot appearing c/d/i. Minimal TTP of mid-lateral forefoot  SKIN: warm  Neuro: A&Ox4

## 2024-04-09 NOTE — ED PROVIDER NOTE - CLINICAL SUMMARY MEDICAL DECISION MAKING FREE TEXT BOX
pt sent in by podiatry for Left Hallux abscess/osteo with plan for admission for operative intervention.

## 2024-04-10 ENCOUNTER — RESULT REVIEW (OUTPATIENT)
Age: 74
End: 2024-04-10

## 2024-04-10 LAB
GLUCOSE BLDC GLUCOMTR-MCNC: 120 MG/DL — HIGH (ref 70–99)
GLUCOSE BLDC GLUCOMTR-MCNC: 169 MG/DL — HIGH (ref 70–99)
GLUCOSE BLDC GLUCOMTR-MCNC: 179 MG/DL — HIGH (ref 70–99)

## 2024-04-10 PROCEDURE — 88311 DECALCIFY TISSUE: CPT | Mod: 26

## 2024-04-10 PROCEDURE — 88307 TISSUE EXAM BY PATHOLOGIST: CPT | Mod: 26

## 2024-04-10 PROCEDURE — 99232 SBSQ HOSP IP/OBS MODERATE 35: CPT

## 2024-04-10 RX ORDER — ISOSORBIDE MONONITRATE 60 MG/1
30 TABLET, EXTENDED RELEASE ORAL DAILY
Refills: 0 | Status: DISCONTINUED | OUTPATIENT
Start: 2024-04-10 | End: 2024-04-12

## 2024-04-10 RX ORDER — DEXTROSE 50 % IN WATER 50 %
15 SYRINGE (ML) INTRAVENOUS ONCE
Refills: 0 | Status: DISCONTINUED | OUTPATIENT
Start: 2024-04-10 | End: 2024-04-12

## 2024-04-10 RX ORDER — FOLIC ACID 0.8 MG
1 TABLET ORAL DAILY
Refills: 0 | Status: DISCONTINUED | OUTPATIENT
Start: 2024-04-10 | End: 2024-04-12

## 2024-04-10 RX ORDER — HYDRALAZINE HCL 50 MG
10 TABLET ORAL ONCE
Refills: 0 | Status: COMPLETED | OUTPATIENT
Start: 2024-04-10 | End: 2024-04-10

## 2024-04-10 RX ORDER — ATORVASTATIN CALCIUM 80 MG/1
20 TABLET, FILM COATED ORAL AT BEDTIME
Refills: 0 | Status: DISCONTINUED | OUTPATIENT
Start: 2024-04-10 | End: 2024-04-12

## 2024-04-10 RX ORDER — SERTRALINE 25 MG/1
50 TABLET, FILM COATED ORAL DAILY
Refills: 0 | Status: DISCONTINUED | OUTPATIENT
Start: 2024-04-10 | End: 2024-04-12

## 2024-04-10 RX ORDER — DEXTROSE 10 % IN WATER 10 %
125 INTRAVENOUS SOLUTION INTRAVENOUS ONCE
Refills: 0 | Status: DISCONTINUED | OUTPATIENT
Start: 2024-04-10 | End: 2024-04-12

## 2024-04-10 RX ORDER — GLUCAGON INJECTION, SOLUTION 0.5 MG/.1ML
1 INJECTION, SOLUTION SUBCUTANEOUS ONCE
Refills: 0 | Status: DISCONTINUED | OUTPATIENT
Start: 2024-04-10 | End: 2024-04-12

## 2024-04-10 RX ORDER — NIFEDIPINE 30 MG
30 TABLET, EXTENDED RELEASE 24 HR ORAL
Refills: 0 | Status: DISCONTINUED | OUTPATIENT
Start: 2024-04-10 | End: 2024-04-12

## 2024-04-10 RX ORDER — INSULIN GLARGINE 100 [IU]/ML
30 INJECTION, SOLUTION SUBCUTANEOUS AT BEDTIME
Refills: 0 | Status: DISCONTINUED | OUTPATIENT
Start: 2024-04-10 | End: 2024-04-12

## 2024-04-10 RX ORDER — SODIUM CHLORIDE 9 MG/ML
1000 INJECTION, SOLUTION INTRAVENOUS
Refills: 0 | Status: DISCONTINUED | OUTPATIENT
Start: 2024-04-10 | End: 2024-04-12

## 2024-04-10 RX ORDER — DEXTROSE 50 % IN WATER 50 %
25 SYRINGE (ML) INTRAVENOUS ONCE
Refills: 0 | Status: DISCONTINUED | OUTPATIENT
Start: 2024-04-10 | End: 2024-04-12

## 2024-04-10 RX ORDER — SODIUM CHLORIDE 9 MG/ML
1000 INJECTION, SOLUTION INTRAVENOUS
Refills: 0 | Status: DISCONTINUED | OUTPATIENT
Start: 2024-04-10 | End: 2024-04-10

## 2024-04-10 RX ORDER — ASPIRIN/CALCIUM CARB/MAGNESIUM 324 MG
81 TABLET ORAL DAILY
Refills: 0 | Status: DISCONTINUED | OUTPATIENT
Start: 2024-04-10 | End: 2024-04-12

## 2024-04-10 RX ORDER — DAPAGLIFLOZIN 10 MG/1
10 TABLET, FILM COATED ORAL EVERY 24 HOURS
Refills: 0 | Status: DISCONTINUED | OUTPATIENT
Start: 2024-04-10 | End: 2024-04-12

## 2024-04-10 RX ORDER — MYCOPHENOLATE MOFETIL 250 MG/1
500 CAPSULE ORAL
Refills: 0 | Status: DISCONTINUED | OUTPATIENT
Start: 2024-04-10 | End: 2024-04-12

## 2024-04-10 RX ORDER — RANOLAZINE 500 MG/1
500 TABLET, FILM COATED, EXTENDED RELEASE ORAL
Refills: 0 | Status: DISCONTINUED | OUTPATIENT
Start: 2024-04-10 | End: 2024-04-12

## 2024-04-10 RX ORDER — TACROLIMUS 5 MG/1
1 CAPSULE ORAL
Refills: 0 | Status: DISCONTINUED | OUTPATIENT
Start: 2024-04-10 | End: 2024-04-12

## 2024-04-10 RX ORDER — LANOLIN ALCOHOL/MO/W.PET/CERES
3 CREAM (GRAM) TOPICAL AT BEDTIME
Refills: 0 | Status: DISCONTINUED | OUTPATIENT
Start: 2024-04-10 | End: 2024-04-12

## 2024-04-10 RX ORDER — HYDROMORPHONE HYDROCHLORIDE 2 MG/ML
0.5 INJECTION INTRAMUSCULAR; INTRAVENOUS; SUBCUTANEOUS
Refills: 0 | Status: DISCONTINUED | OUTPATIENT
Start: 2024-04-10 | End: 2024-04-10

## 2024-04-10 RX ORDER — METOPROLOL TARTRATE 50 MG
50 TABLET ORAL DAILY
Refills: 0 | Status: DISCONTINUED | OUTPATIENT
Start: 2024-04-10 | End: 2024-04-12

## 2024-04-10 RX ORDER — INSULIN LISPRO 100/ML
VIAL (ML) SUBCUTANEOUS
Refills: 0 | Status: DISCONTINUED | OUTPATIENT
Start: 2024-04-10 | End: 2024-04-12

## 2024-04-10 RX ADMIN — Medication 1 TABLET(S): at 17:11

## 2024-04-10 RX ADMIN — Medication 1 TABLET(S): at 05:49

## 2024-04-10 RX ADMIN — INSULIN GLARGINE 30 UNIT(S): 100 INJECTION, SOLUTION SUBCUTANEOUS at 21:20

## 2024-04-10 RX ADMIN — MYCOPHENOLATE MOFETIL 500 MILLIGRAM(S): 250 CAPSULE ORAL at 05:49

## 2024-04-10 RX ADMIN — Medication 2: at 08:43

## 2024-04-10 RX ADMIN — Medication 10 MILLIGRAM(S): at 21:20

## 2024-04-10 RX ADMIN — ATORVASTATIN CALCIUM 20 MILLIGRAM(S): 80 TABLET, FILM COATED ORAL at 21:20

## 2024-04-10 RX ADMIN — TACROLIMUS 1 MILLIGRAM(S): 5 CAPSULE ORAL at 17:12

## 2024-04-10 RX ADMIN — Medication 2: at 11:20

## 2024-04-10 RX ADMIN — RANOLAZINE 500 MILLIGRAM(S): 500 TABLET, FILM COATED, EXTENDED RELEASE ORAL at 05:49

## 2024-04-10 RX ADMIN — Medication 30 MILLIGRAM(S): at 17:12

## 2024-04-10 RX ADMIN — TACROLIMUS 1 MILLIGRAM(S): 5 CAPSULE ORAL at 05:49

## 2024-04-10 RX ADMIN — Medication 50 MILLIGRAM(S): at 05:49

## 2024-04-10 RX ADMIN — RANOLAZINE 500 MILLIGRAM(S): 500 TABLET, FILM COATED, EXTENDED RELEASE ORAL at 17:11

## 2024-04-10 RX ADMIN — MYCOPHENOLATE MOFETIL 500 MILLIGRAM(S): 250 CAPSULE ORAL at 17:11

## 2024-04-10 RX ADMIN — DAPAGLIFLOZIN 10 MILLIGRAM(S): 10 TABLET, FILM COATED ORAL at 17:11

## 2024-04-10 RX ADMIN — Medication 5 MILLIGRAM(S): at 05:49

## 2024-04-10 RX ADMIN — Medication 3 MILLIGRAM(S): at 22:47

## 2024-04-10 NOTE — PROGRESS NOTE ADULT - SUBJECTIVE AND OBJECTIVE BOX
72 y/o M PMHx  DM, HTN, HF,  h/o L renal transplant ( on prograf, cellcept, and prednisone,h/o cardia arrythmia s/p defibrillator, CAD s/p CABG, and  HLD c/o 6 month h/o L foot pain.    Today:  Seen at bedside, no new complaints.        REVIEW OF SYSTEMS:  No new complaints.      MEDICATIONS  (STANDING):  amoxicillin  875 milliGRAM(s)/clavulanate 1 Tablet(s) Oral two times a day  aspirin enteric coated 81 milliGRAM(s) Oral daily  atorvastatin 20 milliGRAM(s) Oral at bedtime  dapagliflozin 10 milliGRAM(s) Oral every 24 hours  dextrose 10% Bolus 125 milliLiter(s) IV Bolus once  dextrose 5% + sodium chloride 0.45%. 1000 milliLiter(s) (55 mL/Hr) IV Continuous <Continuous>  dextrose 5%. 1000 milliLiter(s) (100 mL/Hr) IV Continuous <Continuous>  dextrose 5%. 1000 milliLiter(s) (50 mL/Hr) IV Continuous <Continuous>  dextrose 50% Injectable 25 Gram(s) IV Push once  dextrose 50% Injectable 12.5 Gram(s) IV Push once  folic acid 1 milliGRAM(s) Oral daily  glucagon  Injectable 1 milliGRAM(s) IntraMuscular once  influenza  Vaccine (HIGH DOSE) 0.7 milliLiter(s) IntraMuscular once  insulin glargine Injectable (LANTUS) 30 Unit(s) SubCutaneous at bedtime  insulin lispro (ADMELOG) corrective regimen sliding scale   SubCutaneous three times a day before meals  insulin lispro (ADMELOG) corrective regimen sliding scale   SubCutaneous at bedtime  isosorbide   mononitrate ER Tablet (IMDUR) 30 milliGRAM(s) Oral daily  metoprolol succinate ER 50 milliGRAM(s) Oral daily  mycophenolate mofetil 500 milliGRAM(s) Oral two times a day  NIFEdipine XL 30 milliGRAM(s) Oral two times a day  predniSONE   Tablet 5 milliGRAM(s) Oral daily  ranolazine 500 milliGRAM(s) Oral two times a day  sertraline 50 milliGRAM(s) Oral daily  tacrolimus 1 milliGRAM(s) Oral two times a day    MEDICATIONS  (PRN):  dextrose Oral Gel 15 Gram(s) Oral once PRN Blood Glucose LESS THAN 70 milliGRAM(s)/deciliter  melatonin 3 milliGRAM(s) Oral at bedtime PRN Insomnia      Allergies  No Known Allergies      Vital Signs Last 24 Hrs  T(C): 36.2 (10 Apr 2024 05:32), Max: 36.5 (09 Apr 2024 20:39)  T(F): 97.2 (10 Apr 2024 05:32), Max: 97.7 (09 Apr 2024 20:39)  HR: 60 (10 Apr 2024 05:32) (60 - 62)  BP: 140/80 (10 Apr 2024 05:32) (140/80 - 179/77)  RR: 18 (10 Apr 2024 05:32) (18 - 18)  SpO2: 97% (10 Apr 2024 05:32) (97% - 98%)    Parameters below as of 10 Apr 2024 05:32  Patient On (Oxygen Delivery Method): room air        PHYSICAL EXAM:  GENERAL: laying in bed, appearing comfortable and in NAD  HEENT: Moist mucous membranes  NECK: Supple  CHEST/LUNG: even respirations b/l; no accessory muscle use  ABDOMEN: Soft, Nontender, Nondistended  EXTREMITIES:   No calf TTP b/l; gauze dressing overlying L foot appearing c/d/i. Minimal TTP of mid-lateral forefoot  SKIN: warm  Neuro: A&Ox4    LABS:                        13.3   14.74 )-----------( 250      ( 09 Apr 2024 08:10 )             43.3     04-09    138  |  103  |  19  ----------------------------<  213<H>  4.9   |  23  |  1.2    Ca    9.2      09 Apr 2024 08:10    TPro  6.7  /  Alb  4.3  /  TBili  0.3  /  DBili  x   /  AST  20  /  ALT  14  /  AlkPhos  89  04-09    PT/INR - ( 09 Apr 2024 08:10 )   PT: 11.40 sec;   INR: 1.00 ratio         PTT - ( 09 Apr 2024 08:10 )  PTT:37.2 sec  Urinalysis Basic - ( 09 Apr 2024 08:10 )    Color: x / Appearance: x / SG: x / pH: x  Gluc: 213 mg/dL / Ketone: x  / Bili: x / Urobili: x   Blood: x / Protein: x / Nitrite: x   Leuk Esterase: x / RBC: x / WBC x   Sq Epi: x / Non Sq Epi: x / Bacteria: x

## 2024-04-10 NOTE — CONSULT NOTE ADULT - ASSESSMENT
74 y/o M PMHx  DM, HTN, HF,  h/o L renal transplant (on prograf, cellcept, and prednisone,h/o cardia arrythmia s/p defibrillato, CAD s/p CABG, and  HLD c/o 6 month h/o L foot pain w/ pain worst within past day.    ID is consulted for left foot infection  Afebrile, with leukocytosis  Reported treated for similar infection in 12/2023 @ [facility]  Xray left foot showed Questionable cortical destructive changes of the first distal phalanx and second metatarsal head. Osteomyelitis is not excluded. No evidence of  acute fracture.  CXR no PNA    Antibiotics:  Augmentin 4/9 ->      IMPRESSION:      RECOMMENDATIONS:      * THIS IS AN INCOMPLETE NOTE. FINAL RECOMMENDATION IS PENDING *   74 y/o M PMHx  DM, HTN, HF,  h/o L renal transplant (on prograf, cellcept, and prednisone,h/o cardia arrythmia s/p defibrillato, CAD s/p CABG, and  HLD c/o 6 month h/o L foot pain w/ pain worst within past day.    ID is consulted for left foot infection  Afebrile, with leukocytosis  Reported treated for similar infection in 12/2023 @ [facility]  Xray left foot showed Questionable cortical destructive changes of the first distal phalanx and second metatarsal head. Osteomyelitis is not excluded. No evidence of  acute fracture.  CXR no PNA  4/10 s/p left toe I&D of tissue down to bone, findings of unhealthy and necrotic bone and pus; Deep wound Cx, bone Cx and path pending    Antibiotics:  Augmentin 4/9 ->      IMPRESSION:  Left toe osteomyelitis  Left toe cellulitis  Renal transplant on Prograf, Cellcept and Prednisone    RECOMMENDATIONS:  - D/C Augmentin, continue IV vancomycin 750mg q12hrs and IV Unasyn 3g q6hrs  - Monitor vancomycin trough 30 minutes prior every 4th dose, keep trough around 10 - 15; monitor Cr daily  - Follow up deep wound Cx, bone Cx and path  - Podiatry follow up  - Nephrology follow up for management of immunosuppressant, monitor Prograf level  - Offloading and frequent position changes, aspiration precaution  - Trend WBC, fever curve, transaminases, creatinine daily      Monalisa Salinas D.O.  Attending Physician  Division of Infectious Diseases  [facility] - [facility]  Please contact me via [employer] Teams   74 y/o M PMHx  DM, HTN, HF,  h/o L renal transplant (on prograf, cellcept, and prednisone,h/o cardia arrythmia s/p defibrillato, CAD s/p CABG, and  HLD c/o 6 month h/o L foot pain w/ pain worst within past day.    ID is consulted for left foot infection  Afebrile, with leukocytosis  Reported treated for similar infection in 12/2023 @ [facility]  Xray left foot showed Questionable cortical destructive changes of the first distal phalanx and second metatarsal head. Osteomyelitis is not excluded. No evidence of  acute fracture.  CXR no PNA  4/10 s/p left toe I&D of tissue down to bone, findings of unhealthy and necrotic bone and pus; Deep wound Cx, bone Cx and path pending    Antibiotics:  Augmentin 4/9 ->      IMPRESSION:  Left toe osteomyelitis  Left toe cellulitis  Leukocytosis  Renal transplant on Prograf, Cellcept and Prednisone  DM    RECOMMENDATIONS:  - D/C Augmentin, continue IV vancomycin 750mg q12hrs and IV Unasyn 3g q6hrs  - Monitor vancomycin trough 30 minutes prior every 4th dose, keep trough around 10 - 15; monitor Cr daily  - Follow up deep wound Cx, bone Cx and path  - Podiatry follow up  - Nephrology follow up for management of immunosuppressant, monitor Prograf level  - Offloading and frequent position changes, aspiration precaution  - Trend WBC, fever curve, transaminases, creatinine daily      Monalisa Salinas D.O.  Attending Physician  Division of Infectious Diseases  [facility] - [facility]  Please contact me via [employer] Teams

## 2024-04-10 NOTE — BRIEF OPERATIVE NOTE - NSICDXBRIEFOPLAUNCH_GEN_ALL_CORE
<--- Click to Launch ICDx for PreOp, PostOp and Procedure
Pleural effusion is small and not amenable to thoracentesis observe for now.

## 2024-04-10 NOTE — PROGRESS NOTE ADULT - ASSESSMENT
72 y/o M PMHx  DM, HTN, HF,  h/o L renal transplant ( on prograf, cellcept, and prednisone,h/o cardia arrythmia s/p defibrillator, CAD s/p CABG, and  HLD c/o 6 month h/o L foot pain    Pt denies chest pain, numbness of L foot, recent trauma/injury of L foot, fever, chills, n/v      # Left foot pain w/ documented hx of L foot abscess  - Podiatry recs: OR canceled yesterday  -f/u Lower Extremity Arterial Duplex B/L; (if abnormal, consider Vascular consult)  -ID Consult  -c/w PO abx for now ( per podiatrist provider ( [phone]) until seen by ID  -Pt is medically optimized for planned procedure.  He is asymptomatic from a cardiopulmonary standpoint and can perform >4 METS at baseline.      #h/o L Renal transplant  c/w prograf, prednisone 5mg QD, and cellcept     # CAD now s/p cardiac bypass  #HTN  #HLD  #arrythmia now s/p defibrillator   - c/w Imdur, metoprolol, Nifedipine. Ranexa baby aspirin; hold Eliquis prior to OR    #DM  -Lantus 30 units QHS  -Sliding scale  -CHO diet       - CHG bath  - Diet: NPO for OR today   - VTE ppx: hold eliquis preop; consider restarting subsequently if OK'ed by podiatry  - NWB of LLE; fall risks

## 2024-04-10 NOTE — CONSULT NOTE ADULT - SUBJECTIVE AND OBJECTIVE BOX
INFECTIOUS DISEASE CONSULT NOTE    Patient is a 73y old  Male who presents with a chief complaint of left foot pain (09 Apr 2024 09:42)    HPI:  74 y/o M PMHx  DM, HTN, HF,  h/o L renal transplant ( on prograf, cellcept, and prednisone,h/o cardia arrythmia s/p defibrillato, CAD s/p CABG, and  HLD c/o 6 month h/o L foot pain w/ pain worst within past day. Pt reports  h/o had similar sxs in Dec '23 ( pt admitted for L foot infection at [facility] & tx'ed w/ IV abx). pt reports he saw Dr. Loera for current sxs, has been taking e-rxed Augmentin since yesterday, and advised pt to go to ER if pain sxs got worst.  (09 Apr 2024 09:42)         Prior hospital charts reviewed [Yes]  Primary team notes reviewed [Yes]  Other consultant notes reviewed [Yes]    REVIEW OF SYSTEMS:      PAST MEDICAL & SURGICAL HISTORY:  History of renal transplant      Hypertension      HLD (hyperlipidemia)      HF (heart failure)      CAD (coronary artery disease)      History of cardiac defibrillator placement      H/O kidney transplant      S/P CABG (coronary artery bypass graft)          SOCIAL HISTORY:  - Born in [place], [place] in 19XX  - Currently working as / Retired  - Lives with _____; no pets  - No recent travel  - Denies tobacco use  - Denies alcohol use  - Denies illicit drug use  - Currently sexually active, has one male/female sexual partner    FAMILY HISTORY:      Allergies:  No Known Allergies      ANTIMICROBIALS:  amoxicillin  875 milliGRAM(s)/clavulanate 1 two times a day      ANTIMICROBIALS (past 90 days):  MEDICATIONS  (STANDING):  amoxicillin  875 milliGRAM(s)/clavulanate   1 Tablet(s) Oral (04-10-24 @ 05:49)   1 Tablet(s) Oral (04-09-24 @ 17:40)        OTHER MEDS:   MEDICATIONS  (STANDING):  aspirin enteric coated 81 daily  atorvastatin 20 at bedtime  dapagliflozin 10 every 24 hours  dextrose 50% Injectable 12.5 once  dextrose 50% Injectable 25 once  dextrose Oral Gel 15 once PRN  glucagon  Injectable 1 once  influenza  Vaccine (HIGH DOSE) 0.7 once  insulin glargine Injectable (LANTUS) 30 at bedtime  insulin lispro (ADMELOG) corrective regimen sliding scale  three times a day before meals  insulin lispro (ADMELOG) corrective regimen sliding scale  at bedtime  isosorbide   mononitrate ER Tablet (IMDUR) 30 daily  melatonin 3 at bedtime PRN  metoprolol succinate ER 50 daily  mycophenolate mofetil 500 two times a day  NIFEdipine XL 30 two times a day  predniSONE   Tablet 5 daily  ranolazine 500 two times a day  sertraline 50 daily  tacrolimus 1 two times a day      VITALS:  Vital Signs Last 24 Hrs  T(F): 97.2 (04-10-24 @ 05:32), Max: 97.7 (04-09-24 @ 20:39)    Vital Signs Last 24 Hrs  HR: 60 (04-10-24 @ 05:32) (60 - 62)  BP: 140/80 (04-10-24 @ 05:32) (140/80 - 179/77)  RR: 18 (04-10-24 @ 05:32)  SpO2: 97% (04-10-24 @ 05:32) (97% - 98%)  Wt(kg): --    EXAM:    Labs:                        13.3   14.74 )-----------( 250      ( 09 Apr 2024 08:10 )             43.3     04-09    138  |  103  |  19  ----------------------------<  213<H>  4.9   |  23  |  1.2    Ca    9.2      09 Apr 2024 08:10    TPro  6.7  /  Alb  4.3  /  TBili  0.3  /  DBili  x   /  AST  20  /  ALT  14  /  AlkPhos  89  04-09      WBC Trend:  WBC Count: 14.74 (04-09-24 @ 08:10)      Auto Neutrophil #: 10.97 K/uL (04-09-24 @ 08:10)      Creatine Trend:  Creatinine: 1.2 (04-09)      Liver Biochemical Testing Trend:  Alanine Aminotransferase (ALT/SGPT): 14 (04-09)  Aspartate Aminotransferase (AST/SGOT): 20 (04-09-24 @ 08:10)  Bilirubin Total: 0.3 (04-09)      Trend LDH      Auto Eosinophil %: 0.7 % (04-09-24 @ 08:10)      Urinalysis Basic - ( 09 Apr 2024 08:10 )    Color: x / Appearance: x / SG: x / pH: x  Gluc: 213 mg/dL / Ketone: x  / Bili: x / Urobili: x   Blood: x / Protein: x / Nitrite: x   Leuk Esterase: x / RBC: x / WBC x   Sq Epi: x / Non Sq Epi: x / Bacteria: x        MICROBIOLOGY:          RADIOLOGY:  imaging below personally reviewed    < from: VA Duplex Lower Extrem Arterial, Bilat (04.09.24 @ 10:59) >  Impression:    Diminished flow within the right posterior tibialand peroneal arteries.    Moderate stenosis of the left peroneal artery and diminished flow within   the left posterior tibial artery.    < end of copied text >      < from: Xray Foot AP + Lateral + Oblique, Left (04.09.24 @ 08:15) >  FINDINGS/  IMPRESSION:    Post partial amputation of the first phalanx and complete amputation of   the second phalanx.    Questionable cortical destructive changes of the first distal phalanx and   second metatarsal head. Osteomyelitis is not excluded. No evidence of   acute fracture.    Chronic appearing deformity of the fifth metatarsal.    Degenerative osseous changes and vascular calcification.      < end of copied text >      < from: Xray Chest 1 View-PORTABLE IMMEDIATE (04.09.24 @ 08:15) >  Impression:    Low lung volumes with no radiographic evidence of acute cardiopulmonary   disease.      < end of copied text >   INFECTIOUS DISEASE CONSULT NOTE    Patient is a 73y old  Male who presents with a chief complaint of left foot pain (2024 09:42)    HPI:  74 y/o M PMHx  DM, HTN, HF,  h/o L renal transplant ( on prograf, cellcept, and prednisone,h/o cardia arrythmia s/p defibrillato, CAD s/p CABG, and  HLD c/o 6 month h/o L foot pain w/ pain worst within past day. Pt reports  h/o had similar sxs in Dec '23 ( pt admitted for L foot infection at [facility] & tx'ed w/ IV abx). pt reports he saw Dr. Loera for current sxs, has been taking e-rxed Augmentin since yesterday, and advised pt to go to ER if pain sxs got worst.  (2024 09:42)      Prior hospital charts reviewed [Yes]  Primary team notes reviewed [Yes]  Other consultant notes reviewed [Yes]    REVIEW OF SYSTEMS:  CONSTITUTIONAL: No fever or chills  HEAD: No lesion on scalp  EYES: No visual disturbance  ENT: No sore throat  RESPIRATORY: No cough, no shortness of breath  CARDIOVASCULAR: No chest pain or palpitations  GASTROINTESTINAL: No abdominal or epigastric pain  GENITOURINARY: No dysuria  NEUROLOGICAL: No headache/dizziness  MUSCULOSKELETAL: No joint pain, erythema, or swelling; no back pain  SKIN: Left foot dressed with dressing  All other ROS negative except noted above      PAST MEDICAL & SURGICAL HISTORY:  History of renal transplant      Hypertension      HLD (hyperlipidemia)      HF (heart failure)      CAD (coronary artery disease)      History of cardiac defibrillator placement      H/O kidney transplant      S/P CABG (coronary artery bypass graft)          SOCIAL HISTORY:  - No recent travel  - Denies tobacco use  - Social alcohol use  - Denies illicit drug use    FAMILY HISTORY:  Mother, , DM    Allergies:  No Known Allergies      ANTIMICROBIALS:  amoxicillin  875 milliGRAM(s)/clavulanate 1 two times a day      ANTIMICROBIALS (past 90 days):  MEDICATIONS  (STANDING):  amoxicillin  875 milliGRAM(s)/clavulanate   1 Tablet(s) Oral (04-10-24 @ 05:49)   1 Tablet(s) Oral (24 @ 17:40)        OTHER MEDS:   MEDICATIONS  (STANDING):  aspirin enteric coated 81 daily  atorvastatin 20 at bedtime  dapagliflozin 10 every 24 hours  dextrose 50% Injectable 12.5 once  dextrose 50% Injectable 25 once  dextrose Oral Gel 15 once PRN  glucagon  Injectable 1 once  influenza  Vaccine (HIGH DOSE) 0.7 once  insulin glargine Injectable (LANTUS) 30 at bedtime  insulin lispro (ADMELOG) corrective regimen sliding scale  three times a day before meals  insulin lispro (ADMELOG) corrective regimen sliding scale  at bedtime  isosorbide   mononitrate ER Tablet (IMDUR) 30 daily  melatonin 3 at bedtime PRN  metoprolol succinate ER 50 daily  mycophenolate mofetil 500 two times a day  NIFEdipine XL 30 two times a day  predniSONE   Tablet 5 daily  ranolazine 500 two times a day  sertraline 50 daily  tacrolimus 1 two times a day      VITALS:  Vital Signs Last 24 Hrs  T(F): 97.2 (04-10-24 @ 05:32), Max: 97.7 (24 @ 20:39)    Vital Signs Last 24 Hrs  HR: 60 (04-10-24 @ 05:32) (60 - 62)  BP: 140/80 (04-10-24 @ 05:32) (140/80 - 179/77)  RR: 18 (04-10-24 @ 05:32)  SpO2: 97% (04-10-24 @ 05:32) (97% - 98%)  Wt(kg): --    EXAM:  GENERAL: NAD, lying in bed  HEAD: No head lesions  EYES: Conjunctiva pink and cornea white  EAR, NOSE, MOUTH, THROAT: Normal external ears and nose, no discharges; moist mucous membranes  NECK: Supple, nontender to palpation; no JVD  RESPIRATORY: Clear to auscultation bilaterally  CARDIOVASCULAR: S1 S2  GASTROINTESTINAL: Soft, nontender, nondistended; normoactive bowel sounds  EXTREMITIES: No clubbing, cyanosis, or petal edema  NERVOUS SYSTEM: Alert and oriented to person, time, place and situation, speech clear. No focal deficits   MUSCULOSKELETAL: No joint erythema, swelling or pain  SKIN: Left toe with mild erythema and stitches, no superficial thrombophlebitis  PSYCH: Normal affect      Labs:                        13.3   14.74 )-----------( 250      ( 2024 08:10 )             43.3         138  |  103  |  19  ----------------------------<  213<H>  4.9   |  23  |  1.2    Ca    9.2      2024 08:10    TPro  6.7  /  Alb  4.3  /  TBili  0.3  /  DBili  x   /  AST  20  /  ALT  14  /  AlkPhos  89        WBC Trend:  WBC Count: 14.74 (24 @ 08:10)      Auto Neutrophil #: 10.97 K/uL (24 @ 08:10)      Creatine Trend:  Creatinine: 1.2 ()      Liver Biochemical Testing Trend:  Alanine Aminotransferase (ALT/SGPT): 14 ()  Aspartate Aminotransferase (AST/SGOT): 20 (24 @ 08:10)  Bilirubin Total: 0.3 ()      Trend LDH      Auto Eosinophil %: 0.7 % (24 @ 08:10)      Urinalysis Basic - ( 2024 08:10 )    Color: x / Appearance: x / SG: x / pH: x  Gluc: 213 mg/dL / Ketone: x  / Bili: x / Urobili: x   Blood: x / Protein: x / Nitrite: x   Leuk Esterase: x / RBC: x / WBC x   Sq Epi: x / Non Sq Epi: x / Bacteria: x        MICROBIOLOGY:          RADIOLOGY:  imaging below personally reviewed    < from: VA Duplex Lower Extrem Arterial, Bilat (24 @ 10:59) >  Impression:    Diminished flow within the right posterior tibialand peroneal arteries.    Moderate stenosis of the left peroneal artery and diminished flow within   the left posterior tibial artery.    < end of copied text >      < from: Xray Foot AP + Lateral + Oblique, Left (24 @ 08:15) >  FINDINGS/  IMPRESSION:    Post partial amputation of the first phalanx and complete amputation of   the second phalanx.    Questionable cortical destructive changes of the first distal phalanx and   second metatarsal head. Osteomyelitis is not excluded. No evidence of   acute fracture.    Chronic appearing deformity of the fifth metatarsal.    Degenerative osseous changes and vascular calcification.      < end of copied text >      < from: Xray Chest 1 View-PORTABLE IMMEDIATE (24 @ 08:15) >  Impression:    Low lung volumes with no radiographic evidence of acute cardiopulmonary   disease.      < end of copied text >

## 2024-04-10 NOTE — PRE-ANESTHESIA EVALUATION ADULT - HEIGHT IN INCHES
Pt with 5 days of periods. Does get more emotional with them. Will check iron panel as well as FSH, LH and estradiol. Mom with hx of PCOS. Will send to endo for an evaluation.   9

## 2024-04-11 LAB
GLUCOSE BLDC GLUCOMTR-MCNC: 136 MG/DL — HIGH (ref 70–99)
GLUCOSE BLDC GLUCOMTR-MCNC: 179 MG/DL — HIGH (ref 70–99)
GLUCOSE BLDC GLUCOMTR-MCNC: 180 MG/DL — HIGH (ref 70–99)
GLUCOSE BLDC GLUCOMTR-MCNC: 235 MG/DL — HIGH (ref 70–99)
GRAM STN FLD: SIGNIFICANT CHANGE UP
GRAM STN FLD: SIGNIFICANT CHANGE UP
SPECIMEN SOURCE: SIGNIFICANT CHANGE UP
SPECIMEN SOURCE: SIGNIFICANT CHANGE UP

## 2024-04-11 PROCEDURE — 99232 SBSQ HOSP IP/OBS MODERATE 35: CPT

## 2024-04-11 RX ORDER — AMPICILLIN SODIUM AND SULBACTAM SODIUM 250; 125 MG/ML; MG/ML
3 INJECTION, POWDER, FOR SUSPENSION INTRAMUSCULAR; INTRAVENOUS ONCE
Refills: 0 | Status: COMPLETED | OUTPATIENT
Start: 2024-04-11 | End: 2024-04-11

## 2024-04-11 RX ORDER — VANCOMYCIN HCL 1 G
750 VIAL (EA) INTRAVENOUS EVERY 12 HOURS
Refills: 0 | Status: DISCONTINUED | OUTPATIENT
Start: 2024-04-12 | End: 2024-04-12

## 2024-04-11 RX ORDER — AMPICILLIN SODIUM AND SULBACTAM SODIUM 250; 125 MG/ML; MG/ML
INJECTION, POWDER, FOR SUSPENSION INTRAMUSCULAR; INTRAVENOUS
Refills: 0 | Status: DISCONTINUED | OUTPATIENT
Start: 2024-04-11 | End: 2024-04-12

## 2024-04-11 RX ORDER — VANCOMYCIN HCL 1 G
750 VIAL (EA) INTRAVENOUS ONCE
Refills: 0 | Status: COMPLETED | OUTPATIENT
Start: 2024-04-11 | End: 2024-04-11

## 2024-04-11 RX ORDER — VANCOMYCIN HCL 1 G
VIAL (EA) INTRAVENOUS
Refills: 0 | Status: DISCONTINUED | OUTPATIENT
Start: 2024-04-11 | End: 2024-04-12

## 2024-04-11 RX ORDER — APIXABAN 2.5 MG/1
5 TABLET, FILM COATED ORAL EVERY 12 HOURS
Refills: 0 | Status: DISCONTINUED | OUTPATIENT
Start: 2024-04-11 | End: 2024-04-12

## 2024-04-11 RX ORDER — AMPICILLIN SODIUM AND SULBACTAM SODIUM 250; 125 MG/ML; MG/ML
3 INJECTION, POWDER, FOR SUSPENSION INTRAMUSCULAR; INTRAVENOUS EVERY 6 HOURS
Refills: 0 | Status: DISCONTINUED | OUTPATIENT
Start: 2024-04-11 | End: 2024-04-12

## 2024-04-11 RX ORDER — VANCOMYCIN HCL 1 G
1250 VIAL (EA) INTRAVENOUS EVERY 12 HOURS
Refills: 0 | Status: DISCONTINUED | OUTPATIENT
Start: 2024-04-11 | End: 2024-04-11

## 2024-04-11 RX ADMIN — Medication 1 MILLIGRAM(S): at 11:47

## 2024-04-11 RX ADMIN — AMPICILLIN SODIUM AND SULBACTAM SODIUM 200 GRAM(S): 250; 125 INJECTION, POWDER, FOR SUSPENSION INTRAMUSCULAR; INTRAVENOUS at 11:45

## 2024-04-11 RX ADMIN — AMPICILLIN SODIUM AND SULBACTAM SODIUM 200 GRAM(S): 250; 125 INJECTION, POWDER, FOR SUSPENSION INTRAMUSCULAR; INTRAVENOUS at 17:35

## 2024-04-11 RX ADMIN — TACROLIMUS 1 MILLIGRAM(S): 5 CAPSULE ORAL at 05:43

## 2024-04-11 RX ADMIN — INSULIN GLARGINE 30 UNIT(S): 100 INJECTION, SOLUTION SUBCUTANEOUS at 21:36

## 2024-04-11 RX ADMIN — RANOLAZINE 500 MILLIGRAM(S): 500 TABLET, FILM COATED, EXTENDED RELEASE ORAL at 05:43

## 2024-04-11 RX ADMIN — Medication 250 MILLIGRAM(S): at 18:03

## 2024-04-11 RX ADMIN — MYCOPHENOLATE MOFETIL 500 MILLIGRAM(S): 250 CAPSULE ORAL at 05:42

## 2024-04-11 RX ADMIN — MYCOPHENOLATE MOFETIL 500 MILLIGRAM(S): 250 CAPSULE ORAL at 17:20

## 2024-04-11 RX ADMIN — AMPICILLIN SODIUM AND SULBACTAM SODIUM 200 GRAM(S): 250; 125 INJECTION, POWDER, FOR SUSPENSION INTRAMUSCULAR; INTRAVENOUS at 23:58

## 2024-04-11 RX ADMIN — Medication 30 MILLIGRAM(S): at 17:35

## 2024-04-11 RX ADMIN — Medication 4: at 11:46

## 2024-04-11 RX ADMIN — DAPAGLIFLOZIN 10 MILLIGRAM(S): 10 TABLET, FILM COATED ORAL at 17:20

## 2024-04-11 RX ADMIN — Medication 5 MILLIGRAM(S): at 05:43

## 2024-04-11 RX ADMIN — Medication 81 MILLIGRAM(S): at 11:47

## 2024-04-11 RX ADMIN — APIXABAN 5 MILLIGRAM(S): 2.5 TABLET, FILM COATED ORAL at 21:36

## 2024-04-11 RX ADMIN — Medication 1 TABLET(S): at 05:42

## 2024-04-11 RX ADMIN — Medication 2: at 17:36

## 2024-04-11 RX ADMIN — SERTRALINE 50 MILLIGRAM(S): 25 TABLET, FILM COATED ORAL at 11:47

## 2024-04-11 RX ADMIN — Medication 30 MILLIGRAM(S): at 05:43

## 2024-04-11 RX ADMIN — TACROLIMUS 1 MILLIGRAM(S): 5 CAPSULE ORAL at 17:35

## 2024-04-11 RX ADMIN — Medication 3 MILLIGRAM(S): at 21:54

## 2024-04-11 RX ADMIN — Medication 50 MILLIGRAM(S): at 05:42

## 2024-04-11 RX ADMIN — RANOLAZINE 500 MILLIGRAM(S): 500 TABLET, FILM COATED, EXTENDED RELEASE ORAL at 17:20

## 2024-04-11 RX ADMIN — ISOSORBIDE MONONITRATE 30 MILLIGRAM(S): 60 TABLET, EXTENDED RELEASE ORAL at 11:46

## 2024-04-11 RX ADMIN — ATORVASTATIN CALCIUM 20 MILLIGRAM(S): 80 TABLET, FILM COATED ORAL at 21:53

## 2024-04-11 NOTE — PROGRESS NOTE ADULT - ASSESSMENT
72 y/o M PMHx  DM, HTN, HF,  h/o L renal transplant ( on prograf, cellcept, and prednisone,h/o cardia arrythmia s/p defibrillator, CAD s/p CABG, and  HLD c/o 6 month h/o L foot pain    Pt denies chest pain, numbness of L foot, recent trauma/injury of L foot, fever, chills, n/v      # Left foot pain w/ documented hx of L foot abscess  -  Excisional biopsy of bone; debridement done by podiatry on 4/10/24  - concerning for OM so will need to wait for the culture  -   -f/u Lower Extremity Arterial Duplex B/L; (if abnormal, consider Vascular consult)  -ID Consult  -c/w PO abx for now ( per podiatrist provider ( [phone]) until seen by ID  -Pt is medically optimized for planned procedure.  He is asymptomatic from a cardiopulmonary standpoint and can perform >4 METS at baseline.      #h/o L Renal transplant  c/w prograf, prednisone 5mg QD, and cellcept     # CAD now s/p cardiac bypass  #HTN  #HLD  #arrythmia now s/p defibrillator   - c/w Imdur, metoprolol, Nifedipine. Ranexa baby aspirin; hold Eliquis prior to OR    #DM  -Lantus 30 units QHS  -Sliding scale  -CHO diet       - CHG bath  - Diet: NPO for OR today   - VTE ppx: hold eliquis preop; consider restarting subsequently if OK'ed by podiatry  - NWB of LLE; fall risks  74 y/o M PMHx  DM, HTN, HF,  h/o L renal transplant ( on prograf, cellcept, and prednisone,h/o cardia arrythmia s/p defibrillator, CAD s/p CABG, and  HLD c/o 6 month h/o L foot pain    Pt denies chest pain, numbness of L foot, recent trauma/injury of L foot, fever, chills, n/v      # Left foot pain w/ documented hx of L foot abscess  -  Excisional biopsy of bone; debridement done by podiatry on 4/10/24  - concerning for OM so will need to wait for the culture  - arterial duplex: Diminished flow within the right posterior tibial and peroneal arteries.  Moderate stenosis of the left peroneal artery and diminished flow within   the left posterior tibial artery.  - ID recommended vancomycin and unasyn        #h/o L Renal transplant  c/w prograf, prednisone 5mg QD, and cellcept     # CAD now s/p cardiac bypass  #HTN  #HLD  #arrythmia now s/p defibrillator   - c/w Imdur, metoprolol, Nifedipine. Ranexa baby aspirin; hold Eliquis prior to OR    #DM  -Lantus 30 units QHS  -Sliding scale  -CHO diet       - CHG bath  - Diet: NPO for OR today   - VTE ppx: hold eliquis preop; consider restarting subsequently if OK'ed by podiatry  - NWB of LLE; fall risks  74 y/o M PMHx  DM, HTN, HF,  h/o L renal transplant ( on prograf, cellcept, and prednisone,h/o cardia arrythmia s/p defibrillator, CAD s/p CABG, and  HLD c/o 6 month h/o L foot pain    Pt denies chest pain, numbness of L foot, recent trauma/injury of L foot, fever, chills, n/v      # Left foot pain w/ documented hx of L foot abscess  -  Excisional biopsy of bone; debridement done by podiatry on 4/10/24  - arterial duplex: Diminished flow within the right posterior tibial and peroneal arteries.  Moderate stenosis of the left peroneal artery and diminished flow within   the left posterior tibial artery.  - ID recommended vancomycin and unasyn  - concerning for OM so will need to wait for the culture      #h/o L Renal transplant  c/w prograf, prednisone 5mg QD, and cellcept   - nephro consulted as to whether to hold cellcept at this time    # CAD now s/p cardiac bypass  #HTN  #HLD  #arrythmia now s/p defibrillator   - c/w Imdur, metoprolol, Nifedipine. Ranexa baby aspirin; hold Eliquis prior to OR    #DM  -Lantus 30 units QHS  -Sliding scale  -CHO diet       - CHG bath  - Diet: NPO for OR today   - VTE ppx: hold eliquis preop; consider restarting subsequently if OK'ed by podiatry  - NWB of LLE; fall risks  72 y/o M PMHx  DM, HTN, HF,  h/o L renal transplant (on prograf, cellcept, and prednisone,h/o cardia arrythmia s/p defibrillator, CAD s/p CABG, and HLD c/o 6 month h/o L foot pain    Pt denies chest pain, numbness of L foot, recent trauma/injury of L foot, fever, chills, n/v      # Left foot pain w/ documented hx of L foot abscess  -  Excisional biopsy of bone; debridement done by podiatry on 4/10/24  - arterial duplex: Diminished flow within the right posterior tibial and peroneal arteries.  Moderate stenosis of the left peroneal artery and diminished flow within   the left posterior tibial artery.  - ID recommended vancomycin and unasyn  - concerning for OM so will need to wait for the culture  - wound care  - activity order  - WBAT  - eliquis resumed  - PT/OT      #h/o L Renal transplant  c/w prograf, prednisone 5mg QD, and cellcept   - nephro consulted as to whether to hold cellcept at this time    # CAD now s/p cardiac bypass  #HTN  #HLD  #arrythmia now s/p defibrillator   - c/w Imdur, metoprolol, Nifedipine. Ranexa baby aspirin; hold Eliquis prior to OR    #DM  -Lantus 30 units QHS  -Sliding scale  -CHO diet       - CHG bath  - Diet: diet resumed  - VTE ppx: eliquis  - NWB of LLE; fall risks

## 2024-04-11 NOTE — PROGRESS NOTE ADULT - SUBJECTIVE AND OBJECTIVE BOX
72 y/o M PMHx  DM, HTN, HF,  h/o L renal transplant ( on prograf, cellcept, and prednisone,h/o cardia arrythmia s/p defibrillator, CAD s/p CABG, and  HLD c/o 6 month h/o L foot pain.    Today:  Seen at bedside, no new complaints. He said he is going to go home tomorrow no matter what.        REVIEW OF SYSTEMS:  No new complaints.      MEDICATIONS  (STANDING):  amoxicillin  875 milliGRAM(s)/clavulanate 1 Tablet(s) Oral two times a day  aspirin enteric coated 81 milliGRAM(s) Oral daily  atorvastatin 20 milliGRAM(s) Oral at bedtime  dapagliflozin 10 milliGRAM(s) Oral every 24 hours  dextrose 10% Bolus 125 milliLiter(s) IV Bolus once  dextrose 5% + sodium chloride 0.45%. 1000 milliLiter(s) (55 mL/Hr) IV Continuous <Continuous>  dextrose 5%. 1000 milliLiter(s) (100 mL/Hr) IV Continuous <Continuous>  dextrose 5%. 1000 milliLiter(s) (50 mL/Hr) IV Continuous <Continuous>  dextrose 50% Injectable 25 Gram(s) IV Push once  dextrose 50% Injectable 12.5 Gram(s) IV Push once  folic acid 1 milliGRAM(s) Oral daily  glucagon  Injectable 1 milliGRAM(s) IntraMuscular once  influenza  Vaccine (HIGH DOSE) 0.7 milliLiter(s) IntraMuscular once  insulin glargine Injectable (LANTUS) 30 Unit(s) SubCutaneous at bedtime  insulin lispro (ADMELOG) corrective regimen sliding scale   SubCutaneous three times a day before meals  insulin lispro (ADMELOG) corrective regimen sliding scale   SubCutaneous at bedtime  isosorbide   mononitrate ER Tablet (IMDUR) 30 milliGRAM(s) Oral daily  metoprolol succinate ER 50 milliGRAM(s) Oral daily  mycophenolate mofetil 500 milliGRAM(s) Oral two times a day  NIFEdipine XL 30 milliGRAM(s) Oral two times a day  predniSONE   Tablet 5 milliGRAM(s) Oral daily  ranolazine 500 milliGRAM(s) Oral two times a day  sertraline 50 milliGRAM(s) Oral daily  tacrolimus 1 milliGRAM(s) Oral two times a day    MEDICATIONS  (PRN):  dextrose Oral Gel 15 Gram(s) Oral once PRN Blood Glucose LESS THAN 70 milliGRAM(s)/deciliter  melatonin 3 milliGRAM(s) Oral at bedtime PRN Insomnia      Allergies  No Known Allergies      Vital Signs Last 24 Hrs  T(C): 36.2 (10 Apr 2024 05:32), Max: 36.5 (09 Apr 2024 20:39)  T(F): 97.2 (10 Apr 2024 05:32), Max: 97.7 (09 Apr 2024 20:39)  HR: 60 (10 Apr 2024 05:32) (60 - 62)  BP: 140/80 (10 Apr 2024 05:32) (140/80 - 179/77)  RR: 18 (10 Apr 2024 05:32) (18 - 18)  SpO2: 97% (10 Apr 2024 05:32) (97% - 98%)    Parameters below as of 10 Apr 2024 05:32  Patient On (Oxygen Delivery Method): room air        PHYSICAL EXAM:  GENERAL: laying in bed, appearing comfortable and in NAD  HEENT: Moist mucous membranes  NECK: Supple  CHEST/LUNG: even respirations b/l; no accessory muscle use  ABDOMEN: Soft, Nontender, Nondistended  EXTREMITIES:   No calf TTP b/l; gauze dressing overlying L foot appearing c/d/i. Minimal TTP of mid-lateral forefoot  SKIN: warm  Neuro: A&Ox4    LABS:                        13.3   14.74 )-----------( 250      ( 09 Apr 2024 08:10 )             43.3     04-09    138  |  103  |  19  ----------------------------<  213<H>  4.9   |  23  |  1.2    Ca    9.2      09 Apr 2024 08:10    TPro  6.7  /  Alb  4.3  /  TBili  0.3  /  DBili  x   /  AST  20  /  ALT  14  /  AlkPhos  89  04-09    PT/INR - ( 09 Apr 2024 08:10 )   PT: 11.40 sec;   INR: 1.00 ratio         PTT - ( 09 Apr 2024 08:10 )  PTT:37.2 sec  Urinalysis Basic - ( 09 Apr 2024 08:10 )    Color: x / Appearance: x / SG: x / pH: x  Gluc: 213 mg/dL / Ketone: x  / Bili: x / Urobili: x   Blood: x / Protein: x / Nitrite: x   Leuk Esterase: x / RBC: x / WBC x   Sq Epi: x / Non Sq Epi: x / Bacteria: x

## 2024-04-11 NOTE — PROGRESS NOTE ADULT - SUBJECTIVE AND OBJECTIVE BOX
Podiatry Progress Note    Subjective:  NEMO ALLEN is a  73y Male.   Seen bedside.   Patient is a 73y old  Male who presents with a chief complaint of left foot pain (10 Apr 2024 12:54)      Past Medical History and Surgical History  PAST MEDICAL & SURGICAL HISTORY:  History of renal transplant      Hypertension      HLD (hyperlipidemia)      HF (heart failure)      CAD (coronary artery disease)      History of cardiac defibrillator placement      H/O kidney transplant      S/P CABG (coronary artery bypass graft)           Objective:  Vital Signs Last 24 Hrs  T(C): 35.8 (11 Apr 2024 05:48), Max: 36.6 (10 Apr 2024 14:15)  T(F): 96.5 (11 Apr 2024 05:48), Max: 97.8 (10 Apr 2024 14:15)  HR: 65 (11 Apr 2024 05:48) (60 - 69)  BP: 147/67 (11 Apr 2024 05:48) (136/66 - 187/80)  BP(mean): --  RR: 16 (11 Apr 2024 05:48) (14 - 20)  SpO2: 96% (11 Apr 2024 05:48) (92% - 99%)    Parameters below as of 10 Apr 2024 20:06  Patient On (Oxygen Delivery Method): room air                            Culture - Tissue with Gram Stain (collected 04-10-24 @ 15:13)  Source: .Tissue left foot first toe deep wound  Gram Stain (04-11-24 @ 01:09):    No polymorphonuclear cells seen per low power field    No organisms seen per oil power field    Culture - Tissue with Gram Stain (collected 04-10-24 @ 15:10)  Source: .Tissue left foot first toe bone  Gram Stain (04-11-24 @ 01:09):    No polymorphonuclear leukocytes seen per low power field    No organisms seen per oil power field        Physical Exam - Lower Extremity Focused: LLE  Derm: surgical site with skin sutures intact. Distal aspect opening with packing present. No active bleeding or drainage. NO surrounding erythema  Vascular: DP and PT Pulses Diminished; Foot is Warm to Warm to the touch; Capillary Refill Time < 3 Seconds;    Neuro: Protective Sensation Diminished / Moderately Neuropathic   MSK: Pain On Palpation at Wound Site     Assessment:  s/p excisional debridement soft tissue and bone 4/10/24    Plan:  Chart reviewed and Patient evaluated. All Questions and Concerns Addressed and Answered  Local Wound Care; xeroform, 4x4, kerlix, ace   Weight Bearing Status; WBAT w surgical shoe   Continue w/ Local Wound Care; Q24 Dressing Changes  No Further Sx Debridement; Clean margins obtained in OR  Patient Stable Per Podiatry Standpoint; Follow Up as an Outpatient w/ Dr. Fernandes  Discussed Plan w/ Attending     Podiatry   Please message on teams for further questions     Podiatry Progress Note    Subjective:  NEMO ALLEN is a  73y Male.   Seen bedside.   Patient is a 73y old  Male who presents with a chief complaint of left foot pain (10 Apr 2024 12:54)      Past Medical History and Surgical History  PAST MEDICAL & SURGICAL HISTORY:  History of renal transplant      Hypertension      HLD (hyperlipidemia)      HF (heart failure)      CAD (coronary artery disease)      History of cardiac defibrillator placement      H/O kidney transplant      S/P CABG (coronary artery bypass graft)           Objective:  Vital Signs Last 24 Hrs  T(C): 35.8 (11 Apr 2024 05:48), Max: 36.6 (10 Apr 2024 14:15)  T(F): 96.5 (11 Apr 2024 05:48), Max: 97.8 (10 Apr 2024 14:15)  HR: 65 (11 Apr 2024 05:48) (60 - 69)  BP: 147/67 (11 Apr 2024 05:48) (136/66 - 187/80)  BP(mean): --  RR: 16 (11 Apr 2024 05:48) (14 - 20)  SpO2: 96% (11 Apr 2024 05:48) (92% - 99%)    Parameters below as of 10 Apr 2024 20:06  Patient On (Oxygen Delivery Method): room air                            Culture - Tissue with Gram Stain (collected 04-10-24 @ 15:13)  Source: .Tissue left foot first toe deep wound  Gram Stain (04-11-24 @ 01:09):    No polymorphonuclear cells seen per low power field    No organisms seen per oil power field    Culture - Tissue with Gram Stain (collected 04-10-24 @ 15:10)  Source: .Tissue left foot first toe bone  Gram Stain (04-11-24 @ 01:09):    No polymorphonuclear leukocytes seen per low power field    No organisms seen per oil power field        Physical Exam - Lower Extremity Focused: LLE  Derm: surgical site with skin sutures intact. Distal aspect opening with packing present. No active bleeding or drainage. NO surrounding erythema  Vascular: DP and PT Pulses Diminished; Foot is Warm to Warm to the touch; Capillary Refill Time < 3 Seconds;    Neuro: Protective Sensation Diminished / Moderately Neuropathic   MSK: Pain On Palpation at Wound Site     Assessment:  s/p excisional debridement soft tissue and bone 4/10/24    Plan:  Chart reviewed and Patient evaluated. All Questions and Concerns Addressed and Answered  Local Wound Care; xeroform, 4x4, kerlix, ace   Weight Bearing Status; WBAT w surgical shoe   Continue w/ Local Wound Care; Q24 Dressing Changes  No Further Sx Debridement; Clean margins obtained in OR  Patient Stable Per Podiatry Standpoint; Follow Up as an Outpatient w/ Dr. Fernandes  Discussed Plan w/ Attending     Podiatry     Attending note. Wound stable and can be managed outpatient.  Please message on teams for further questions

## 2024-04-11 NOTE — PHYSICAL THERAPY INITIAL EVALUATION ADULT - RANGE OF MOTION EXAMINATION, REHAB EVAL
L hip and knee - WFL, L ankle cant be assessed 2/2 dressing/Right LE ROM was WFL (within functional limits)/deficits as listed below

## 2024-04-11 NOTE — PHYSICAL THERAPY INITIAL EVALUATION ADULT - GENERAL OBSERVATIONS, REHAB EVAL
11;35-12;00 pt was seen for PT IE at bed side, pt is agreeable, chart thoroughly reviewed, RN Mackenzie is aware.  Pt was received semi schulz in bed, in no apparent distress, +hep lock, L foot dressing=ace srap, +call bell within reach, bed side table at reach.

## 2024-04-11 NOTE — PHYSICAL THERAPY INITIAL EVALUATION ADULT - PERTINENT HX OF CURRENT PROBLEM, REHAB EVAL
72 y/o M PMHx  DM, HTN, HF,  h/o L renal transplant ( on prograf, cellcept, and prednisone,h/o cardia arrythmia s/p defibrillato, CAD s/p CABG, and  HLD c/o 6 month h/o L foot pain w/ pain worst within past day. Pt reports  h/o had similar sxs in Dec '23 ( pt admitted for L foot infection at [facility] & tx'ed w/ IV abx). pt reports he saw Dr. Loera for current sxs, has been taking e-rxed Augmentin since yesterday, and advised pt to go to ER if pain sxs got worst.

## 2024-04-11 NOTE — PHARMACOTHERAPY INTERVENTION NOTE - COMMENTS
As per policy, ordered a vancomycin level for 4/13 at 11am in order to assist with vancomycin pharmacokinetic monitoring.    Nancy Ann, PharmD, [facility]  Clinical Pharmacy Specialist, Infectious Diseases  Tele-Antimicrobial Stewardship Program (Tele-ASP)  Tele-ASP Phone: [phone]  
As per policy, adjusted vancomycin dose from 1250 mg IV q12h to 750 mg IV q12h. As per [employer] calculations, the steady-state vancomycin AUC/SAGE on the former dose is 873 mg/L*h, which is greater than the therapeutic range of 400 - 600 mg/L*h. Decreasing the dose is predicted to yield an AUC/SAGE of 524 mg/L*h.    Nancy Ann, PharmD, BCIDP  Clinical Pharmacy Specialist, Infectious Diseases  Tele-Antimicrobial Stewardship Program (Tele-ASP)  Tele-ASP Phone: [phone]

## 2024-04-11 NOTE — PHYSICAL THERAPY INITIAL EVALUATION ADULT - ADDITIONAL COMMENTS
pt lives with his wife in apartment building with elevator and 5-6 steps to enter with rails.  Pt stated not using any AD but admits difficulties walking without AD.  Stated he is "ashamed to use cane or walker in front of the neighbors and  friends.

## 2024-04-12 ENCOUNTER — TRANSCRIPTION ENCOUNTER (OUTPATIENT)
Age: 74
End: 2024-04-12

## 2024-04-12 VITALS
OXYGEN SATURATION: 98 % | RESPIRATION RATE: 16 BRPM | TEMPERATURE: 98 F | SYSTOLIC BLOOD PRESSURE: 126 MMHG | DIASTOLIC BLOOD PRESSURE: 71 MMHG | HEART RATE: 63 BPM

## 2024-04-12 LAB
-  CLINDAMYCIN: SIGNIFICANT CHANGE UP
-  ERYTHROMYCIN: SIGNIFICANT CHANGE UP
-  GENTAMICIN: SIGNIFICANT CHANGE UP
-  OXACILLIN: SIGNIFICANT CHANGE UP
-  RIFAMPIN: SIGNIFICANT CHANGE UP
-  TETRACYCLINE: SIGNIFICANT CHANGE UP
-  TRIMETHOPRIM/SULFAMETHOXAZOLE: SIGNIFICANT CHANGE UP
-  VANCOMYCIN: SIGNIFICANT CHANGE UP
ANION GAP SERPL CALC-SCNC: 13 MMOL/L — SIGNIFICANT CHANGE UP (ref 7–14)
BASOPHILS # BLD AUTO: 0.02 K/UL — SIGNIFICANT CHANGE UP (ref 0–0.2)
BASOPHILS NFR BLD AUTO: 0.2 % — SIGNIFICANT CHANGE UP (ref 0–1)
BUN SERPL-MCNC: 16 MG/DL — SIGNIFICANT CHANGE UP (ref 10–20)
CALCIUM SERPL-MCNC: 8.9 MG/DL — SIGNIFICANT CHANGE UP (ref 8.4–10.5)
CHLORIDE SERPL-SCNC: 105 MMOL/L — SIGNIFICANT CHANGE UP (ref 98–110)
CO2 SERPL-SCNC: 22 MMOL/L — SIGNIFICANT CHANGE UP (ref 17–32)
CREAT SERPL-MCNC: 1 MG/DL — SIGNIFICANT CHANGE UP (ref 0.7–1.5)
CRP SERPL-MCNC: 7.1 MG/L — HIGH
EGFR: 80 ML/MIN/1.73M2 — SIGNIFICANT CHANGE UP
EOSINOPHIL # BLD AUTO: 0.11 K/UL — SIGNIFICANT CHANGE UP (ref 0–0.7)
EOSINOPHIL NFR BLD AUTO: 1 % — SIGNIFICANT CHANGE UP (ref 0–8)
ERYTHROCYTE [SEDIMENTATION RATE] IN BLOOD: 20 MM/HR — HIGH (ref 0–10)
GLUCOSE BLDC GLUCOMTR-MCNC: 119 MG/DL — HIGH (ref 70–99)
GLUCOSE BLDC GLUCOMTR-MCNC: 141 MG/DL — HIGH (ref 70–99)
GLUCOSE BLDC GLUCOMTR-MCNC: 146 MG/DL — HIGH (ref 70–99)
GLUCOSE BLDC GLUCOMTR-MCNC: 272 MG/DL — HIGH (ref 70–99)
GLUCOSE BLDC GLUCOMTR-MCNC: 285 MG/DL — HIGH (ref 70–99)
GLUCOSE SERPL-MCNC: 141 MG/DL — HIGH (ref 70–99)
HCT VFR BLD CALC: 40.4 % — LOW (ref 42–52)
HGB BLD-MCNC: 13.1 G/DL — LOW (ref 14–18)
IMM GRANULOCYTES NFR BLD AUTO: 0.2 % — SIGNIFICANT CHANGE UP (ref 0.1–0.3)
LYMPHOCYTES # BLD AUTO: 19 % — LOW (ref 20.5–51.1)
LYMPHOCYTES # BLD AUTO: 2.02 K/UL — SIGNIFICANT CHANGE UP (ref 1.2–3.4)
MCHC RBC-ENTMCNC: 24.1 PG — LOW (ref 27–31)
MCHC RBC-ENTMCNC: 32.4 G/DL — SIGNIFICANT CHANGE UP (ref 32–37)
MCV RBC AUTO: 74.3 FL — LOW (ref 80–94)
METHOD TYPE: SIGNIFICANT CHANGE UP
MONOCYTES # BLD AUTO: 0.73 K/UL — HIGH (ref 0.1–0.6)
MONOCYTES NFR BLD AUTO: 6.9 % — SIGNIFICANT CHANGE UP (ref 1.7–9.3)
NEUTROPHILS # BLD AUTO: 7.71 K/UL — HIGH (ref 1.4–6.5)
NEUTROPHILS NFR BLD AUTO: 72.7 % — SIGNIFICANT CHANGE UP (ref 42.2–75.2)
NRBC # BLD: 0 /100 WBCS — SIGNIFICANT CHANGE UP (ref 0–0)
PLATELET # BLD AUTO: 199 K/UL — SIGNIFICANT CHANGE UP (ref 130–400)
PMV BLD: 9.2 FL — SIGNIFICANT CHANGE UP (ref 7.4–10.4)
POTASSIUM SERPL-MCNC: 4.1 MMOL/L — SIGNIFICANT CHANGE UP (ref 3.5–5)
POTASSIUM SERPL-SCNC: 4.1 MMOL/L — SIGNIFICANT CHANGE UP (ref 3.5–5)
RBC # BLD: 5.44 M/UL — SIGNIFICANT CHANGE UP (ref 4.7–6.1)
RBC # FLD: 19 % — HIGH (ref 11.5–14.5)
SODIUM SERPL-SCNC: 140 MMOL/L — SIGNIFICANT CHANGE UP (ref 135–146)
WBC # BLD: 10.61 K/UL — SIGNIFICANT CHANGE UP (ref 4.8–10.8)
WBC # FLD AUTO: 10.61 K/UL — SIGNIFICANT CHANGE UP (ref 4.8–10.8)

## 2024-04-12 RX ORDER — METOPROLOL TARTRATE 50 MG
1 TABLET ORAL
Qty: 0 | Refills: 0 | DISCHARGE
Start: 2024-04-12

## 2024-04-12 RX ORDER — ATORVASTATIN CALCIUM 80 MG/1
1 TABLET, FILM COATED ORAL
Qty: 0 | Refills: 0 | DISCHARGE
Start: 2024-04-12

## 2024-04-12 RX ORDER — ASPIRIN/CALCIUM CARB/MAGNESIUM 324 MG
1 TABLET ORAL
Qty: 0 | Refills: 0 | DISCHARGE
Start: 2024-04-12

## 2024-04-12 RX ORDER — INSULIN LISPRO 100/ML
0 VIAL (ML) SUBCUTANEOUS
Refills: 0 | DISCHARGE

## 2024-04-12 RX ORDER — METOPROLOL TARTRATE 50 MG
0 TABLET ORAL
Qty: 0 | Refills: 0 | DISCHARGE

## 2024-04-12 RX ORDER — ERTAPENEM SODIUM 1 G/1
1000 INJECTION, POWDER, LYOPHILIZED, FOR SOLUTION INTRAMUSCULAR; INTRAVENOUS ONCE
Refills: 0 | Status: COMPLETED | OUTPATIENT
Start: 2024-04-12 | End: 2024-04-12

## 2024-04-12 RX ORDER — APIXABAN 2.5 MG/1
0 TABLET, FILM COATED ORAL
Qty: 0 | Refills: 0 | DISCHARGE

## 2024-04-12 RX ORDER — ASPIRIN/CALCIUM CARB/MAGNESIUM 324 MG
1 TABLET ORAL
Refills: 0 | DISCHARGE

## 2024-04-12 RX ORDER — DAPAGLIFLOZIN 10 MG/1
0 TABLET, FILM COATED ORAL
Qty: 0 | Refills: 0 | DISCHARGE

## 2024-04-12 RX ORDER — INSULIN LISPRO 100/ML
8 VIAL (ML) SUBCUTANEOUS
Qty: 1 | Refills: 0
Start: 2024-04-12 | End: 2024-05-11

## 2024-04-12 RX ORDER — ERTAPENEM SODIUM 1 G/1
1 INJECTION, POWDER, LYOPHILIZED, FOR SOLUTION INTRAMUSCULAR; INTRAVENOUS
Qty: 0 | Refills: 0 | DISCHARGE
Start: 2024-04-12

## 2024-04-12 RX ORDER — ATORVASTATIN CALCIUM 80 MG/1
0 TABLET, FILM COATED ORAL
Qty: 0 | Refills: 0 | DISCHARGE

## 2024-04-12 RX ORDER — SERTRALINE 25 MG/1
0 TABLET, FILM COATED ORAL
Qty: 0 | Refills: 0 | DISCHARGE

## 2024-04-12 RX ADMIN — DAPAGLIFLOZIN 10 MILLIGRAM(S): 10 TABLET, FILM COATED ORAL at 17:13

## 2024-04-12 RX ADMIN — TACROLIMUS 1 MILLIGRAM(S): 5 CAPSULE ORAL at 17:13

## 2024-04-12 RX ADMIN — Medication 1 MILLIGRAM(S): at 11:29

## 2024-04-12 RX ADMIN — AMPICILLIN SODIUM AND SULBACTAM SODIUM 200 GRAM(S): 250; 125 INJECTION, POWDER, FOR SUSPENSION INTRAMUSCULAR; INTRAVENOUS at 07:17

## 2024-04-12 RX ADMIN — MYCOPHENOLATE MOFETIL 500 MILLIGRAM(S): 250 CAPSULE ORAL at 05:59

## 2024-04-12 RX ADMIN — Medication 81 MILLIGRAM(S): at 11:29

## 2024-04-12 RX ADMIN — Medication 5 MILLIGRAM(S): at 05:59

## 2024-04-12 RX ADMIN — Medication 50 MILLIGRAM(S): at 05:59

## 2024-04-12 RX ADMIN — RANOLAZINE 500 MILLIGRAM(S): 500 TABLET, FILM COATED, EXTENDED RELEASE ORAL at 17:14

## 2024-04-12 RX ADMIN — Medication 30 MILLIGRAM(S): at 05:59

## 2024-04-12 RX ADMIN — APIXABAN 5 MILLIGRAM(S): 2.5 TABLET, FILM COATED ORAL at 17:14

## 2024-04-12 RX ADMIN — MYCOPHENOLATE MOFETIL 500 MILLIGRAM(S): 250 CAPSULE ORAL at 17:14

## 2024-04-12 RX ADMIN — APIXABAN 5 MILLIGRAM(S): 2.5 TABLET, FILM COATED ORAL at 06:00

## 2024-04-12 RX ADMIN — TACROLIMUS 1 MILLIGRAM(S): 5 CAPSULE ORAL at 05:59

## 2024-04-12 RX ADMIN — ISOSORBIDE MONONITRATE 30 MILLIGRAM(S): 60 TABLET, EXTENDED RELEASE ORAL at 11:29

## 2024-04-12 RX ADMIN — ATORVASTATIN CALCIUM 20 MILLIGRAM(S): 80 TABLET, FILM COATED ORAL at 21:04

## 2024-04-12 RX ADMIN — SERTRALINE 50 MILLIGRAM(S): 25 TABLET, FILM COATED ORAL at 11:29

## 2024-04-12 RX ADMIN — ERTAPENEM SODIUM 120 MILLIGRAM(S): 1 INJECTION, POWDER, LYOPHILIZED, FOR SOLUTION INTRAMUSCULAR; INTRAVENOUS at 16:04

## 2024-04-12 RX ADMIN — AMPICILLIN SODIUM AND SULBACTAM SODIUM 200 GRAM(S): 250; 125 INJECTION, POWDER, FOR SUSPENSION INTRAMUSCULAR; INTRAVENOUS at 11:29

## 2024-04-12 RX ADMIN — RANOLAZINE 500 MILLIGRAM(S): 500 TABLET, FILM COATED, EXTENDED RELEASE ORAL at 05:59

## 2024-04-12 RX ADMIN — Medication 6: at 11:39

## 2024-04-12 RX ADMIN — Medication 250 MILLIGRAM(S): at 07:18

## 2024-04-12 RX ADMIN — Medication 30 MILLIGRAM(S): at 17:13

## 2024-04-12 NOTE — DISCHARGE NOTE PROVIDER - PROVIDER TOKENS
PROVIDER:[TOKEN:[[id number]],FOLLOWUP:[1 week]] PROVIDER:[TOKEN:[[id number]],FOLLOWUP:[1 week]],PROVIDER:[TOKEN:[[id number]]]

## 2024-04-12 NOTE — DISCHARGE NOTE PROVIDER - HOSPITAL COURSE
74 y/o M PMHx  DM, HTN, HF,  h/o L renal transplant (on prograf, cellcept, and prednisone,h/o cardia arrythmia s/p defibrillator, CAD s/p CABG, and HLD c/o 6 month h/o L foot pain  Pt was admitted to medicine for Left foot pain w/ documented hx of L foot abscess  -  Excisional biopsy of bone; debridement done by podiatry on 4/10/24  - arterial duplex: Diminished flow within the right posterior tibial and peroneal arteries. Moderate stenosis of the left peroneal artery and diminished flow within   the left posterior tibial artery.  - pt treated with vancomycin and unasyn, ID consulted who recommended ___________________________  Pt with Left renal transplant. Nephrology was consulted as to whether to hold cellcept at this time____________________________________     74 y/o M PMHx  DM, HTN, HF,  h/o L renal transplant (on prograf, cellcept, and prednisone,h/o cardia arrythmia s/p defibrillator, CAD s/p CABG, and HLD c/o 6 month h/o L foot pain  Pt was admitted to medicine for Left foot pain w/ documented hx of L foot abscess  -  Excisional biopsy of bone; debridement done by podiatry on 4/10/24  - arterial duplex: Diminished flow within the right posterior tibial and peroneal arteries. Moderate stenosis of the left peroneal artery and diminished flow within   the left posterior tibial artery.  - pt treated with vancomycin and unasyn, ID consulted who recommended IV ertapenem 1 qq24h and PO doxycycline 100 mg q12h for 4 weeks (until 5/8)  Pt with Left renal transplant. Nephrology was consulted as to whether to hold cellcept at this time____________________________________     72 y/o M PMHx  DM, HTN, HF,  h/o L renal transplant (on prograf, cellcept, and prednisone, h/o cardia arrythmia s/p defibrillator, CAD s/p CABG, and HLD c/o 6 month h/o L foot pain  Pt was admitted to medicine for Left foot pain w/ documented hx of L foot abscess concerning for OM  -  Excisional biopsy of bone; debridement done by podiatry on 4/10/24  - arterial duplex: Diminished flow within the right posterior tibial and peroneal arteries. Moderate stenosis of the left peroneal artery and diminished flow within   the left posterior tibial artery. Will need op vascular followup  - pt treated with vancomycin and unasyn, ID consulted who recommended IV ertapenem 1 qq24h and PO doxycycline 100 mg q12h for 4 weeks (until 5/8)  Pt with Left renal transplant. Nephrology recs appreciated and recommended to continue mycophenolate, tacrolimus, and prednisone doses unchanged at this time with op f/u with his transplant surgeon at [facility]. Pt's wife is a nurse and home care services set up for infusion teaching and initiation.

## 2024-04-12 NOTE — DISCHARGE NOTE PROVIDER - CARE PROVIDERS DIRECT ADDRESSES
[email or web address]

## 2024-04-12 NOTE — DISCHARGE NOTE PROVIDER - NSDCMRMEDTOKEN_GEN_ALL_CORE_FT
Aspir 81 oral delayed release tablet: 1 tab(s) orally  ATORVASTATIN 20 MG TABLET:   CellCept 500 mg oral tablet: 1 tab(s) orally 2 times a day  ELIQUIS 5 MG TABLET:   FARXIGA 10 MG TABLET:   folic acid 1 mg oral tablet: 1 tab(s) orally once a day  HUMALOG 100 UNIT/ML KWIKPEN: ISS  Imdur 30 mg oral tablet, extended release: 1 tab(s) orally once a day (in the morning)  LANTUS SOLOSTAR 100 UNIT/ML: 30 unit(s) subcutaneous once a day (at bedtime)  METOPROLOL SUCC ER 50 MG TAB:   NIFEDIPINE ER 30 MG TABLET: 1 tab(s) orally 2 times a day  predniSONE 5 mg oral tablet: 1 tab(s) orally once a day  Prograf 0.5 mg oral capsule: 1 cap(s) orally 2 times a day  RANOLAZINE  MG TABLET: orally 2 times a day  SERTRALINE 50MG TABLETS:    Aspir 81 oral delayed release tablet: 1 tab(s) orally  ATORVASTATIN 20 MG TABLET:   CellCept 500 mg oral tablet: 1 tab(s) orally 2 times a day  doxycycline hyclate 100 mg oral tablet: 1 tab(s) orally 2 times a day  ELIQUIS 5 MG TABLET:   FARXIGA 10 MG TABLET:   folic acid 1 mg oral tablet: 1 tab(s) orally once a day  HUMALOG 100 UNIT/ML KWIKPEN: ISS  Imdur 30 mg oral tablet, extended release: 1 tab(s) orally once a day (in the morning)  LANTUS SOLOSTAR 100 UNIT/ML: 30 unit(s) subcutaneous once a day (at bedtime)  METOPROLOL SUCC ER 50 MG TAB:   NIFEDIPINE ER 30 MG TABLET: 1 tab(s) orally 2 times a day  predniSONE 5 mg oral tablet: 1 tab(s) orally once a day  Prograf 0.5 mg oral capsule: 1 cap(s) orally 2 times a day  RANOLAZINE  MG TABLET: orally 2 times a day  SERTRALINE 50MG TABLETS:    aspirin 81 mg oral delayed release tablet: 1 tab(s) orally once a day  atorvastatin 20 mg oral tablet: 1 tab(s) orally once a day (at bedtime)  CellCept 500 mg oral tablet: 1 tab(s) orally 2 times a day  doxycycline hyclate 100 mg oral tablet: 1 tab(s) orally 2 times a day  ELIQUIS 5 MG TABLET: orally 2 times a day  ertapenem 1 g injection: 1 gram(s) injectable every 12 hours  folic acid 1 mg oral tablet: 1 tab(s) orally once a day  HumaLOG KwikPen 100 units/mL injectable solution: 8 unit(s) injectable 3 times a day (before meals) Check fingerstick and dose insulin accordingly  Imdur 30 mg oral tablet, extended release: 1 tab(s) orally once a day (in the morning)  LANTUS SOLOSTAR 100 UNIT/ML: 30 unit(s) subcutaneous once a day (at bedtime)  metoprolol succinate 50 mg oral tablet, extended release: 1 tab(s) orally once a day  NIFEDIPINE ER 30 MG TABLET: 1 tab(s) orally 2 times a day  predniSONE 5 mg oral tablet: 1 tab(s) orally once a day  Prograf 0.5 mg oral capsule: 1 cap(s) orally 2 times a day  RANOLAZINE  MG TABLET: orally 2 times a day  SERTRALINE 50MG TABLETS: orally once a day

## 2024-04-12 NOTE — DISCHARGE NOTE NURSING/CASE MANAGEMENT/SOCIAL WORK - PATIENT PORTAL LINK FT
You can access the FollowMyHealth Patient Portal offered by [facility] by registering at the following website: [email or web address]. By joining [employer]’s FollowMyHealth portal, you will also be able to view your health information using other applications (apps) compatible with our system.

## 2024-04-12 NOTE — DISCHARGE NOTE PROVIDER - NSDCFUADDINST_GEN_ALL_CORE_FT
- Return to Emergency room if develop any persistent fever > 101, chills, tremors, persistent nausea/vomiting, severe pain not relieved by pain medication, inability to urinate, chest pains, difficulty breathing or any bleeding.   - Return to Emergency room if develop any persistent fever > 101, chills, tremors, persistent nausea/vomiting, severe pain not relieved by pain medication, inability to urinate, chest pains, difficulty breathing or any bleeding.  WBAT

## 2024-04-12 NOTE — CONSULT NOTE ADULT - SUBJECTIVE AND OBJECTIVE BOX
NEPHROLOGY CONSULTATION NOTE    NEMO ALLEN  73y  Male  MRN-[id number]    CC:   Patient is a 73y old  Male who presents with a chief complaint of left foot pain (11 Apr 2024 16:24)      HPI:  74 y/o M PMHx  DM, HTN, HF,  h/o L renal transplant ( on prograf, cellcept, and prednisone,h/o cardia arrythmia s/p defibrillato, CAD s/p CABG, and  HLD c/o 6 month h/o L foot pain w/ pain worst within past day. Pt reports  h/o had similar sxs in Dec '23 ( pt admitted for L foot infection at [facility] & tx'ed w/ IV abx). pt reports he saw Dr. Loera for current sxs, has been taking e-rxed Augmentin since yesterday, and advised pt to go to ER if pain sxs got worst.  (09 Apr 2024 09:42)      PAST MEDICAL & SURGICAL HISTORY:  History of renal transplant      Hypertension      HLD (hyperlipidemia)      HF (heart failure)      CAD (coronary artery disease)      History of cardiac defibrillator placement      H/O kidney transplant      S/P CABG (coronary artery bypass graft)        Allergies:  No Known Allergies    Home Medications Reviewed  Hospital Medications:   MEDICATIONS  (STANDING):  ampicillin/sulbactam  IVPB 3 Gram(s) IV Intermittent every 6 hours  apixaban 5 milliGRAM(s) Oral every 12 hours  aspirin enteric coated 81 milliGRAM(s) Oral daily  atorvastatin 20 milliGRAM(s) Oral at bedtime  dapagliflozin 10 milliGRAM(s) Oral every 24 hours  dextrose 10% Bolus 125 milliLiter(s) IV Bolus once  dextrose 5%. 1000 milliLiter(s) (50 mL/Hr) IV Continuous <Continuous>  dextrose 50% Injectable 25 Gram(s) IV Push once  folic acid 1 milliGRAM(s) Oral daily  glucagon  Injectable 1 milliGRAM(s) IntraMuscular once  influenza  Vaccine (HIGH DOSE) 0.7 milliLiter(s) IntraMuscular once  insulin glargine Injectable (LANTUS) 30 Unit(s) SubCutaneous at bedtime  insulin lispro (ADMELOG) corrective regimen sliding scale   SubCutaneous three times a day before meals  isosorbide   mononitrate ER Tablet (IMDUR) 30 milliGRAM(s) Oral daily  metoprolol succinate ER 50 milliGRAM(s) Oral daily  mycophenolate mofetil 500 milliGRAM(s) Oral two times a day  NIFEdipine XL 30 milliGRAM(s) Oral two times a day  predniSONE   Tablet 5 milliGRAM(s) Oral daily  ranolazine 500 milliGRAM(s) Oral two times a day  sertraline 50 milliGRAM(s) Oral daily  tacrolimus 1 milliGRAM(s) Oral two times a day  vancomycin  IVPB 750 milliGRAM(s) IV Intermittent every 12 hours    MEDICATIONS  (PRN):  dextrose Oral Gel 15 Gram(s) Oral once PRN Blood Glucose LESS THAN 70 milliGRAM(s)/deciliter  melatonin 3 milliGRAM(s) Oral at bedtime PRN Insomnia    Home Medications:  Aspir 81 oral delayed release tablet: 1 tab(s) orally (09 Apr 2024 10:22)  ATORVASTATIN 20 MG TABLET:  (09 Apr 2024 10:27)  CellCept 500 mg oral tablet: 1 tab(s) orally 2 times a day (09 Apr 2024 10:18)  ELIQUIS 5 MG TABLET:  (09 Apr 2024 10:27)  FARXIGA 10 MG TABLET:  (09 Apr 2024 10:27)  folic acid 1 mg oral tablet: 1 tab(s) orally once a day (09 Apr 2024 10:22)  HUMALOG 100 UNIT/ML KWIKPEN: ISS (09 Apr 2024 10:42)  Imdur 30 mg oral tablet, extended release: 1 tab(s) orally once a day (in the morning) (09 Apr 2024 10:21)  LANTUS SOLOSTAR 100 UNIT/ML: 30 unit(s) subcutaneous once a day (at bedtime) (09 Apr 2024 10:25)  METOPROLOL SUCC ER 50 MG TAB:  (09 Apr 2024 10:27)  NIFEDIPINE ER 30 MG TABLET: 1 tab(s) orally 2 times a day (09 Apr 2024 10:40)  predniSONE 5 mg oral tablet: 1 tab(s) orally once a day (09 Apr 2024 10:20)  Prograf 0.5 mg oral capsule: 1 cap(s) orally 2 times a day (09 Apr 2024 10:19)  RANOLAZINE  MG TABLET: orally 2 times a day (09 Apr 2024 10:37)  SERTRALINE 50MG TABLETS:  (09 Apr 2024 10:27)      SOCIAL HISTORY:  Social History:  pt denies h/o smoking  pt denies h/o alcohol use  pt denies h/o illicit drug use (09 Apr 2024 09:42)      FAMILY HISTORY:      REVIEW OF SYSTEMS:  All other review of systems is negative unless indicated above.    VITALS:  T(F): 96 (04-12-24 @ 05:28), Max: 97.1 (04-11-24 @ 14:42)  HR: 60 (04-12-24 @ 05:28)  BP: 137/64 (04-12-24 @ 05:28)  RR: 16 (04-12-24 @ 05:28)  SpO2: 96% (04-12-24 @ 05:28)      I&O's Detail        I&O's Summary      PHYSICAL EXAM:  Gen: NAD  resp: b/l breath sounds  card: S1/S2  abd: soft  ext: no edema  vascular access:     LABS:  Daily     Daily       04-12    140  |  105  |  16  ----------------------------<  141<H>  4.1   |  22  |  1.0    Ca    8.9      12 Apr 2024 08:19        Creatinine Trend:   Creatinine: 1.0 mg/dL (04-12-24 @ 08:19)  Creatinine: 1.2 mg/dL (04-09-24 @ 08:10)                            13.1   10.61 )-----------( 199      ( 12 Apr 2024 08:19 )             40.4     Mean Cell Volume: 74.3 fL (04-12-24 @ 08:19)    Urine Studies:              RADIOLOGY & ADDITIONAL STUDIES:

## 2024-04-12 NOTE — PROGRESS NOTE ADULT - TIME BILLING
I have personally seen and examined this patient.    I have reviewed all pertinent clinical information and reviewed all relevant imaging and diagnostic studies personally.   I discussed recommendations with the primary team. I have personally seen and examined this patient prior to his discharge.  I have reviewed all pertinent clinical information and reviewed all relevant imaging and diagnostic studies personally.   I discussed recommendations with the primary team.

## 2024-04-12 NOTE — DISCHARGE NOTE PROVIDER - NSDCCPCAREPLAN_GEN_ALL_CORE_FT
PRINCIPAL DISCHARGE DIAGNOSIS  Diagnosis: Wound of foot  Assessment and Plan of Treatment: you were taken to OR with podiatry and had excisional debridement soft tissue and bone on 4/10/24  please continue antibiotics as directed  continue wound care: xeroform, 4x4, kerlix, ace once a day  please follow up with podiatry in 1 week for reassessment     PRINCIPAL DISCHARGE DIAGNOSIS  Diagnosis: Wound of foot  Assessment and Plan of Treatment: you were taken to OR with podiatry and had excisional debridement soft tissue and bone on 4/10/24  please continue antibiotics as directed. Ertepenem to be administered via midline; Doxycycline was sent to pharmacy  continue wound care: xeroform, 4x4, kerlix, ace once a day  please follow up with podiatry in 1 week for reassessment   Weekly CBC, CMP  - ID follow-up with Dr. Jayant Ramirez for Telehealth. We will call the patient between 10:30-1:30      [address] [facility]       [phone]       Fax [phone]     PRINCIPAL DISCHARGE DIAGNOSIS  Diagnosis: Wound of foot  Assessment and Plan of Treatment: you were taken to OR with podiatry and had excisional debridement soft tissue and bone on 4/10/24  please continue antibiotics as directed. Ertepenem to be administered via midline; Doxycycline was sent to pharmacy  continue wound care: xeroform  please follow up with podiatry in 1 week for reassessment   Weekly CBC, CMP  - ID follow-up with Dr. Jayant Ramirez for Telehealth. We will call the patient between 10:30-1:30      [address]        [phone]       Fax [phone]

## 2024-04-12 NOTE — PROGRESS NOTE ADULT - SUBJECTIVE AND OBJECTIVE BOX
INFECTIOUS DISEASE FOLLOW UP NOTE:    Interval History/ROS: Patient is a 73y old  Male who presents with a chief complaint of left foot pain (12 Apr 2024 14:06)      Overnight events: No overnight event. Feels well today, Eager to go home    REVIEW OF SYSTEMS:  CONSTITUTIONAL: No fever or chills  HEAD: No lesion on scalp  EYES: No visual disturbance  ENT: No sore throat  RESPIRATORY: No cough, no shortness of breath  CARDIOVASCULAR: No chest pain or palpitations  GASTROINTESTINAL: No abdominal or epigastric pain  GENITOURINARY: No dysuria  NEUROLOGICAL: No headache/dizziness  MUSCULOSKELETAL: No joint pain, erythema, or swelling; no back pain  SKIN: Left foot dressed with dressing  All other ROS negative except noted above      Prior hospital charts reviewed [Yes]  Primary team notes reviewed [Yes]  Other consultant notes reviewed [Yes]    Allergies:  No Known Allergies      ANTIMICROBIALS:       OTHER MEDS: MEDICATIONS  (STANDING):  apixaban 5 every 12 hours  aspirin enteric coated 81 daily  atorvastatin 20 at bedtime  dapagliflozin 10 every 24 hours  dextrose 50% Injectable 25 once  dextrose Oral Gel 15 once PRN  glucagon  Injectable 1 once  influenza  Vaccine (HIGH DOSE) 0.7 once  insulin glargine Injectable (LANTUS) 30 at bedtime  insulin lispro (ADMELOG) corrective regimen sliding scale  three times a day before meals  isosorbide   mononitrate ER Tablet (IMDUR) 30 daily  melatonin 3 at bedtime PRN  metoprolol succinate ER 50 daily  mycophenolate mofetil 500 two times a day  NIFEdipine XL 30 two times a day  predniSONE   Tablet 5 daily  ranolazine 500 two times a day  sertraline 50 daily  tacrolimus 1 two times a day      Vital Signs Last 24 Hrs  T(F): 97.5 (04-12-24 @ 13:59), Max: 97.8 (04-10-24 @ 14:15)    Vital Signs Last 24 Hrs  HR: 63 (04-12-24 @ 13:59) (60 - 63)  BP: 126/71 (04-12-24 @ 13:59) (126/71 - 137/64)  RR: 16 (04-12-24 @ 13:59)  SpO2: 98% (04-12-24 @ 13:59) (96% - 98%)  Wt(kg): --    EXAM:  GENERAL: NAD, lying in bed  HEAD: No head lesions  EYES: Conjunctiva pink and cornea white  EAR, NOSE, MOUTH, THROAT: Normal external ears and nose, no discharges; moist mucous membranes  NECK: Supple, nontender to palpation; no JVD  RESPIRATORY: Clear to auscultation bilaterally  CARDIOVASCULAR: S1 S2  GASTROINTESTINAL: Soft, nontender, nondistended; normoactive bowel sounds  EXTREMITIES: No clubbing, cyanosis, or petal edema  NERVOUS SYSTEM: Alert and oriented to person, time, place and situation, speech clear. No focal deficits   MUSCULOSKELETAL: No joint erythema, swelling or pain  SKIN: Left toe with mild erythema and stitches, no superficial thrombophlebitis  PSYCH: Normal affect      Labs:                        13.1   10.61 )-----------( 199      ( 12 Apr 2024 08:19 )             40.4     04-12    140  |  105  |  16  ----------------------------<  141<H>  4.1   |  22  |  1.0    Ca    8.9      12 Apr 2024 08:19        WBC Trend:  WBC Count: 10.61 (04-12-24 @ 08:19)  WBC Count: 14.74 (04-09-24 @ 08:10)      Creatine Trend:  Creatinine: 1.0 (04-12)  Creatinine: 1.2 (04-09)      Liver Biochemical Testing Trend:  Alanine Aminotransferase (ALT/SGPT): 14 (04-09)  Aspartate Aminotransferase (AST/SGOT): 20 (04-09-24 @ 08:10)  Bilirubin Total: 0.3 (04-09)      Trend LDH      Urinalysis Basic - ( 12 Apr 2024 08:19 )    Color: x / Appearance: x / SG: x / pH: x  Gluc: 141 mg/dL / Ketone: x  / Bili: x / Urobili: x   Blood: x / Protein: x / Nitrite: x   Leuk Esterase: x / RBC: x / WBC x   Sq Epi: x / Non Sq Epi: x / Bacteria: x        MICROBIOLOGY:        Culture - Tissue with Gram Stain (collected 10 Apr 2024 15:13)  Source: .Tissue left foot first toe deep wound  Preliminary Report:    No growth    Culture - Tissue with Gram Stain (collected 10 Apr 2024 15:10)  Source: .Tissue left foot first toe bone  Organism: Staphylococcus aureus  Organism: Staphylococcus aureus    Sensitivities:      Method Type: SAGE      -  Clindamycin: S <=0.25      -  Erythromycin: S <=0.25      -  Gentamicin: S <=1 Should not be used as monotherapy      -  Oxacillin: S <=0.25 Oxacillin predicts susceptibility for dicloxacillin, methicillin, and nafcillin      -  Rifampin: S <=1 Should not be used as monotherapy      -  Tetracycline: S <=1      -  Trimethoprim/Sulfamethoxazole: S <=0.5/9.5      -  Vancomycin: S 2    C-Reactive Protein, Serum: 7.1 (04-12)      RADIOLOGY:  imaging below personally reviewed    < from: VA Duplex Lower Extrem Arterial, Bilat (04.09.24 @ 10:59) >  Impression:    Diminished flow within the right posterior tibialand peroneal arteries.    Moderate stenosis of the left peroneal artery and diminished flow within   the left posterior tibial artery.    ICD-10:I73.89    < end of copied text >    < from: Xray Foot AP + Lateral + Oblique, Left (04.09.24 @ 08:15) >  FINDINGS/  IMPRESSION:    Post partial amputation of the first phalanx and complete amputation of   the second phalanx.    Questionable cortical destructive changes of the first distal phalanx and   second metatarsal head. Osteomyelitis is not excluded. No evidence of   acute fracture.    Chronic appearing deformity of the fifth metatarsal.    Degenerative osseous changes and vascular calcification.    < end of copied text >

## 2024-04-12 NOTE — PROGRESS NOTE ADULT - ASSESSMENT
74 y/o M PMHx  DM, HTN, HF,  h/o L renal transplant (on prograf, cellcept, and prednisone,h/o cardia arrythmia s/p defibrillato, CAD s/p CABG, and  HLD c/o 6 month h/o L foot pain w/ pain worst within past day.    ID is following for left foot infection  Afebrile, with leukocytosis  Reported treated for similar infection in 12/2023 @ [facility]  Xray left foot showed Questionable cortical destructive changes of the first distal phalanx and second metatarsal head. Osteomyelitis is not excluded. No evidence of  acute fracture.  CXR no PNA  4/10 s/p left toe I&D of tissue down to bone, findings of unhealthy and necrotic bone and pus; Deep wound Cx, bone Cx and path pending    Antibiotics:  Augmentin 4/9 ->      IMPRESSION:  Left toe osteomyelitis  Left toe cellulitis  Leukocytosis  Renal transplant on Prograf, Cellcept and Prednisone  DM    RECOMMENDATIONS:  - Can discharge on IV ertapanem 1g q24hrs and PO doxycycline 100mg q12hrs x 4 weeks post-op (until 5/7)  - Follow up deep wound Cx, bone Cx and path  - Podiatry outpatient follow up, case discussed with Dr. Loera  - Nephrology follow up for management of immunosuppressant, monitor Prograf level  - Offloading and frequent position changes, aspiration precaution  - Trend WBC, fever curve, transaminases, creatinine daily    - Weekly CBC, CMP, ESR/CRP  - ID follow-up with Dr. Jayant Ramierz for Telehealth. We will call the patient between 10:30-1:30      [address]       [phone]       Fax [phone]      Monalisa Salinas D.O.  Attending Physician  Division of Infectious Diseases  [facility]  Please contact me via [employer] Teams

## 2024-04-12 NOTE — PROGRESS NOTE ADULT - SUBJECTIVE AND OBJECTIVE BOX
PROGRESS NOTE   Patient is a 73y old  Male who presents with a chief complaint of left foot pain (12 Apr 2024 11:00)      HPI:  74 y/o M PMHx  DM, HTN, HF,  h/o L renal transplant ( on prograf, cellcept, and prednisone,h/o cardia arrythmia s/p defibrillato, CAD s/p CABG, and  HLD c/o 6 month h/o L foot pain w/ pain worst within past day. Pt reports  h/o had similar sxs in Dec '23 ( pt admitted for L foot infection at [facility] & tx'ed w/ IV abx). pt reports he saw Dr. Loera for current sxs, has been taking e-rxed Augmentin since yesterday, and advised pt to go to ER if pain sxs got worst.  (09 Apr 2024 09:42)      Vital Signs Last 24 Hrs  T(C): 36.4 (12 Apr 2024 13:59), Max: 36.4 (12 Apr 2024 13:59)  T(F): 97.5 (12 Apr 2024 13:59), Max: 97.5 (12 Apr 2024 13:59)  HR: 63 (12 Apr 2024 13:59) (60 - 64)  BP: 126/71 (12 Apr 2024 13:59) (126/71 - 158/70)  BP(mean): --  RR: 16 (12 Apr 2024 13:59) (16 - 20)  SpO2: 98% (12 Apr 2024 13:59) (96% - 99%)    Parameters below as of 11 Apr 2024 20:51  Patient On (Oxygen Delivery Method): room air                              13.1   10.61 )-----------( 199      ( 12 Apr 2024 08:19 )             40.4               04-12    140  |  105  |  16  ----------------------------<  141<H>  4.1   |  22  |  1.0    Ca    8.9      12 Apr 2024 08:19        PHYSICAL EXAM  GEN: NEMO ALLEN is a pleasant well-nourished, well developed 73y Male in no acute distress, alert awake, and oriented to person, place and time.   LE Focused: Pt seen at bedside. Wound well coapted. Packing pulled. No pus or   malodor noted. Wound to be dressed with xeroform. Full weight bearing allowed with darco surgical shoe. Will f/u in office next wednesday.

## 2024-04-12 NOTE — DISCHARGE NOTE PROVIDER - CARE PROVIDER_API CALL
Kika Dumont  Podiatric Medicine and Surgery  [address] [place] [id number]  Phone: [phone]  Fax: [phone]  Follow Up Time: 1 week   Kika Dumont  Podiatric Medicine and Surgery  [address] [place] [address]  Phone: [phone]  Fax: [phone]  Follow Up Time: 1 week    Jayant Escamilla  Infectious Disease  [address] [place] [address]  Phone: [phone]  Fax: [phone]  Follow Up Time:

## 2024-04-12 NOTE — CONSULT NOTE ADULT - ASSESSMENT
patient s/p living unrelated kidney transplant 2 years ago, follows with Dr. Nuñez at [facility]  - cont tacrolimus, prednisone, MMF home doses

## 2024-04-15 LAB
CULTURE RESULTS: ABNORMAL
CULTURE RESULTS: NO GROWTH — SIGNIFICANT CHANGE UP
GRAM STN FLD: ABNORMAL
ORGANISM # SPEC MICROSCOPIC CNT: ABNORMAL
ORGANISM # SPEC MICROSCOPIC CNT: SIGNIFICANT CHANGE UP
SPECIMEN SOURCE: SIGNIFICANT CHANGE UP
SURGICAL PATHOLOGY STUDY: SIGNIFICANT CHANGE UP

## 2024-04-19 DIAGNOSIS — I50.9 HEART FAILURE, UNSPECIFIED: ICD-10-CM

## 2024-04-19 DIAGNOSIS — M89.772 MAJOR OSSEOUS DEFECT, LEFT ANKLE AND FOOT: ICD-10-CM

## 2024-04-19 DIAGNOSIS — I11.0 HYPERTENSIVE HEART DISEASE WITH HEART FAILURE: ICD-10-CM

## 2024-04-19 DIAGNOSIS — L02.612 CUTANEOUS ABSCESS OF LEFT FOOT: ICD-10-CM

## 2024-04-19 DIAGNOSIS — Z95.810 PRESENCE OF AUTOMATIC (IMPLANTABLE) CARDIAC DEFIBRILLATOR: ICD-10-CM

## 2024-04-19 DIAGNOSIS — B95.61 METHICILLIN SUSCEPTIBLE STAPHYLOCOCCUS AUREUS INFECTION AS THE CAUSE OF DISEASES CLASSIFIED ELSEWHERE: ICD-10-CM

## 2024-04-19 DIAGNOSIS — L03.116 CELLULITIS OF LEFT LOWER LIMB: ICD-10-CM

## 2024-04-19 DIAGNOSIS — Z79.69 LONG TERM (CURRENT) USE OF OTHER IMMUNOMODULATORS AND IMMUNOSUPPRESSANTS: ICD-10-CM

## 2024-04-19 DIAGNOSIS — Z79.4 LONG TERM (CURRENT) USE OF INSULIN: ICD-10-CM

## 2024-04-19 DIAGNOSIS — E11.69 TYPE 2 DIABETES MELLITUS WITH OTHER SPECIFIED COMPLICATION: ICD-10-CM

## 2024-04-19 DIAGNOSIS — I25.10 ATHEROSCLEROTIC HEART DISEASE OF NATIVE CORONARY ARTERY WITHOUT ANGINA PECTORIS: ICD-10-CM

## 2024-04-19 DIAGNOSIS — Z79.52 LONG TERM (CURRENT) USE OF SYSTEMIC STEROIDS: ICD-10-CM

## 2024-04-19 DIAGNOSIS — E11.51 TYPE 2 DIABETES MELLITUS WITH DIABETIC PERIPHERAL ANGIOPATHY WITHOUT GANGRENE: ICD-10-CM

## 2024-04-19 DIAGNOSIS — Z95.1 PRESENCE OF AORTOCORONARY BYPASS GRAFT: ICD-10-CM

## 2024-04-19 DIAGNOSIS — I49.9 CARDIAC ARRHYTHMIA, UNSPECIFIED: ICD-10-CM

## 2024-04-19 DIAGNOSIS — E78.5 HYPERLIPIDEMIA, UNSPECIFIED: ICD-10-CM

## 2024-04-19 DIAGNOSIS — Z94.0 KIDNEY TRANSPLANT STATUS: ICD-10-CM

## 2024-04-19 DIAGNOSIS — L03.032 CELLULITIS OF LEFT TOE: ICD-10-CM

## 2024-04-19 DIAGNOSIS — Z89.422 ACQUIRED ABSENCE OF OTHER LEFT TOE(S): ICD-10-CM

## 2024-04-19 DIAGNOSIS — M86.472 CHRONIC OSTEOMYELITIS WITH DRAINING SINUS, LEFT ANKLE AND FOOT: ICD-10-CM

## 2024-04-23 DIAGNOSIS — L97.523 NON-PRESSURE CHRONIC ULCER OF OTHER PART OF LEFT FOOT WITH NECROSIS OF MUSCLE: ICD-10-CM

## 2024-04-23 DIAGNOSIS — E11.52 TYPE 2 DIABETES MELLITUS WITH DIABETIC PERIPHERAL ANGIOPATHY WITH GANGRENE: ICD-10-CM

## 2024-04-23 DIAGNOSIS — E11.69 TYPE 2 DIABETES MELLITUS WITH OTHER SPECIFIED COMPLICATION: ICD-10-CM

## 2024-04-23 DIAGNOSIS — E11.621 TYPE 2 DIABETES MELLITUS WITH FOOT ULCER: ICD-10-CM

## 2025-01-10 PROBLEM — I50.9 HEART FAILURE, UNSPECIFIED: Chronic | Status: ACTIVE | Noted: 2024-04-09

## 2025-01-10 PROBLEM — I25.10 ATHEROSCLEROTIC HEART DISEASE OF NATIVE CORONARY ARTERY WITHOUT ANGINA PECTORIS: Chronic | Status: ACTIVE | Noted: 2024-04-09

## 2025-01-10 PROBLEM — I10 ESSENTIAL (PRIMARY) HYPERTENSION: Chronic | Status: ACTIVE | Noted: 2024-04-09

## 2025-01-10 PROBLEM — Z94.0 KIDNEY TRANSPLANT STATUS: Chronic | Status: ACTIVE | Noted: 2024-04-09

## 2025-01-10 PROBLEM — E78.5 HYPERLIPIDEMIA, UNSPECIFIED: Chronic | Status: ACTIVE | Noted: 2024-04-09

## 2025-01-24 ENCOUNTER — NON-APPOINTMENT (OUTPATIENT)
Age: 75
End: 2025-01-24

## 2025-01-24 ENCOUNTER — APPOINTMENT (OUTPATIENT)
Dept: CARDIOLOGY | Facility: CLINIC | Age: 75
End: 2025-01-24
Payer: MEDICARE

## 2025-01-24 VITALS
HEIGHT: 73 IN | DIASTOLIC BLOOD PRESSURE: 80 MMHG | BODY MASS INDEX: 28.63 KG/M2 | RESPIRATION RATE: 16 BRPM | WEIGHT: 216 LBS | TEMPERATURE: 97 F | HEART RATE: 65 BPM | OXYGEN SATURATION: 99 % | SYSTOLIC BLOOD PRESSURE: 125 MMHG

## 2025-01-24 DIAGNOSIS — I25.10 ATHEROSCLEROTIC HEART DISEASE OF NATIVE CORONARY ARTERY W/OUT ANGINA PECTORIS: ICD-10-CM

## 2025-01-24 DIAGNOSIS — Z95.0 PRESENCE OF CARDIAC PACEMAKER: ICD-10-CM

## 2025-01-24 DIAGNOSIS — E78.5 HYPERLIPIDEMIA, UNSPECIFIED: ICD-10-CM

## 2025-01-24 DIAGNOSIS — M79.606 PAIN IN LEG, UNSPECIFIED: ICD-10-CM

## 2025-01-24 DIAGNOSIS — I10 ESSENTIAL (PRIMARY) HYPERTENSION: ICD-10-CM

## 2025-01-24 DIAGNOSIS — Z87.891 PERSONAL HISTORY OF NICOTINE DEPENDENCE: ICD-10-CM

## 2025-01-24 DIAGNOSIS — Z95.1 PRESENCE OF AORTOCORONARY BYPASS GRAFT: ICD-10-CM

## 2025-01-24 PROCEDURE — 99204 OFFICE O/P NEW MOD 45 MIN: CPT

## 2025-01-24 PROCEDURE — 93000 ELECTROCARDIOGRAM COMPLETE: CPT

## 2025-01-24 PROCEDURE — G2211 COMPLEX E/M VISIT ADD ON: CPT

## 2025-01-24 RX ORDER — TACROLIMUS 1 MG/1
1 CAPSULE, GELATIN COATED ORAL
Refills: 0 | Status: ACTIVE | COMMUNITY

## 2025-01-24 RX ORDER — SERTRALINE HYDROCHLORIDE 50 MG/1
50 TABLET, FILM COATED ORAL
Refills: 0 | Status: ACTIVE | COMMUNITY

## 2025-01-24 RX ORDER — ATORVASTATIN CALCIUM 20 MG/1
20 TABLET, FILM COATED ORAL
Refills: 0 | Status: ACTIVE | COMMUNITY

## 2025-01-24 RX ORDER — PREDNISONE 5 MG/1
5 TABLET ORAL
Refills: 0 | Status: ACTIVE | COMMUNITY

## 2025-01-24 RX ORDER — ISOSORBIDE MONONITRATE 60 MG/1
60 TABLET, EXTENDED RELEASE ORAL
Refills: 0 | Status: ACTIVE | COMMUNITY

## 2025-01-24 RX ORDER — FOLIC ACID 1 MG/1
1 TABLET ORAL
Refills: 0 | Status: ACTIVE | COMMUNITY

## 2025-01-24 RX ORDER — METOPROLOL TARTRATE 50 MG/1
50 TABLET ORAL DAILY
Refills: 0 | Status: ACTIVE | COMMUNITY

## 2025-01-24 RX ORDER — DAPAGLIFLOZIN 10 MG/1
10 TABLET, FILM COATED ORAL
Refills: 0 | Status: ACTIVE | COMMUNITY

## 2025-01-24 RX ORDER — RANOLAZINE 500 MG/1
500 TABLET, FILM COATED, EXTENDED RELEASE ORAL
Refills: 0 | Status: ACTIVE | COMMUNITY

## 2025-01-24 RX ORDER — APIXABAN 5 MG/1
5 TABLET, FILM COATED ORAL
Refills: 0 | Status: ACTIVE | COMMUNITY

## 2025-01-24 RX ORDER — ASPIRIN ENTERIC COATED TABLETS 81 MG 81 MG/1
81 TABLET, DELAYED RELEASE ORAL
Refills: 0 | Status: ACTIVE | COMMUNITY

## 2025-06-09 ENCOUNTER — INPATIENT (INPATIENT)
Facility: HOSPITAL | Age: 75
LOS: 1 days | Discharge: HOME HEALTH | DRG: 617 | End: 2025-06-11
Attending: STUDENT IN AN ORGANIZED HEALTH CARE EDUCATION/TRAINING PROGRAM | Admitting: STUDENT IN AN ORGANIZED HEALTH CARE EDUCATION/TRAINING PROGRAM
Payer: MEDICARE

## 2025-06-09 VITALS
WEIGHT: 205.03 LBS | DIASTOLIC BLOOD PRESSURE: 81 MMHG | SYSTOLIC BLOOD PRESSURE: 159 MMHG | HEART RATE: 57 BPM | OXYGEN SATURATION: 100 % | RESPIRATION RATE: 18 BRPM | TEMPERATURE: 98 F

## 2025-06-09 DIAGNOSIS — Z95.1 PRESENCE OF AORTOCORONARY BYPASS GRAFT: Chronic | ICD-10-CM

## 2025-06-09 DIAGNOSIS — E08.621 DIABETES MELLITUS DUE TO UNDERLYING CONDITION WITH FOOT ULCER: ICD-10-CM

## 2025-06-09 DIAGNOSIS — Z94.0 KIDNEY TRANSPLANT STATUS: Chronic | ICD-10-CM

## 2025-06-09 DIAGNOSIS — Z95.810 PRESENCE OF AUTOMATIC (IMPLANTABLE) CARDIAC DEFIBRILLATOR: Chronic | ICD-10-CM

## 2025-06-09 LAB
ALBUMIN SERPL ELPH-MCNC: 4.2 G/DL — SIGNIFICANT CHANGE UP (ref 3.5–5.2)
ALP SERPL-CCNC: 92 U/L — SIGNIFICANT CHANGE UP (ref 30–115)
ALT FLD-CCNC: 26 U/L — SIGNIFICANT CHANGE UP (ref 0–41)
ANION GAP SERPL CALC-SCNC: 11 MMOL/L — SIGNIFICANT CHANGE UP (ref 7–14)
APTT BLD: 44.6 SEC — HIGH (ref 27–39.2)
AST SERPL-CCNC: 30 U/L — SIGNIFICANT CHANGE UP (ref 0–41)
BASE EXCESS BLDV CALC-SCNC: 1.1 MMOL/L — SIGNIFICANT CHANGE UP (ref -2–3)
BASOPHILS # BLD AUTO: 0.03 K/UL — SIGNIFICANT CHANGE UP (ref 0–0.2)
BASOPHILS NFR BLD AUTO: 0.2 % — SIGNIFICANT CHANGE UP (ref 0–1)
BILIRUB SERPL-MCNC: 0.4 MG/DL — SIGNIFICANT CHANGE UP (ref 0.2–1.2)
BUN SERPL-MCNC: 21 MG/DL — HIGH (ref 10–20)
CA-I SERPL-SCNC: 1.25 MMOL/L — SIGNIFICANT CHANGE UP (ref 1.15–1.33)
CALCIUM SERPL-MCNC: 8.8 MG/DL — SIGNIFICANT CHANGE UP (ref 8.4–10.5)
CHLORIDE SERPL-SCNC: 105 MMOL/L — SIGNIFICANT CHANGE UP (ref 98–110)
CO2 SERPL-SCNC: 20 MMOL/L — SIGNIFICANT CHANGE UP (ref 17–32)
CREAT SERPL-MCNC: 1 MG/DL — SIGNIFICANT CHANGE UP (ref 0.7–1.5)
EGFR: 80 ML/MIN/1.73M2 — SIGNIFICANT CHANGE UP
EGFR: 80 ML/MIN/1.73M2 — SIGNIFICANT CHANGE UP
EOSINOPHIL # BLD AUTO: 0.09 K/UL — SIGNIFICANT CHANGE UP (ref 0–0.7)
EOSINOPHIL NFR BLD AUTO: 0.7 % — SIGNIFICANT CHANGE UP (ref 0–8)
GAS PNL BLDV: 130 MMOL/L — LOW (ref 136–145)
GAS PNL BLDV: SIGNIFICANT CHANGE UP
GLUCOSE BLDC GLUCOMTR-MCNC: 137 MG/DL — HIGH (ref 70–99)
GLUCOSE BLDC GLUCOMTR-MCNC: 151 MG/DL — HIGH (ref 70–99)
GLUCOSE SERPL-MCNC: 172 MG/DL — HIGH (ref 70–99)
HCO3 BLDV-SCNC: 29 MMOL/L — SIGNIFICANT CHANGE UP (ref 22–29)
HCT VFR BLD CALC: 52.2 % — HIGH (ref 42–52)
HCT VFR BLDA CALC: 53 % — HIGH (ref 39–51)
HGB BLD CALC-MCNC: 17.8 G/DL — HIGH (ref 12.6–17.4)
HGB BLD-MCNC: 17 G/DL — SIGNIFICANT CHANGE UP (ref 14–18)
IMM GRANULOCYTES NFR BLD AUTO: 0.3 % — SIGNIFICANT CHANGE UP (ref 0.1–0.3)
INR BLD: 1.08 RATIO — SIGNIFICANT CHANGE UP (ref 0.65–1.3)
LACTATE BLDV-MCNC: 1.6 MMOL/L — SIGNIFICANT CHANGE UP (ref 0.5–2)
LYMPHOCYTES # BLD AUTO: 19.1 % — LOW (ref 20.5–51.1)
LYMPHOCYTES # BLD AUTO: 2.32 K/UL — SIGNIFICANT CHANGE UP (ref 1.2–3.4)
MCHC RBC-ENTMCNC: 28.6 PG — SIGNIFICANT CHANGE UP (ref 27–31)
MCHC RBC-ENTMCNC: 32.6 G/DL — SIGNIFICANT CHANGE UP (ref 32–37)
MCV RBC AUTO: 87.9 FL — SIGNIFICANT CHANGE UP (ref 80–94)
MONOCYTES # BLD AUTO: 0.75 K/UL — HIGH (ref 0.1–0.6)
MONOCYTES NFR BLD AUTO: 6.2 % — SIGNIFICANT CHANGE UP (ref 1.7–9.3)
NEUTROPHILS # BLD AUTO: 8.92 K/UL — HIGH (ref 1.4–6.5)
NEUTROPHILS NFR BLD AUTO: 73.5 % — SIGNIFICANT CHANGE UP (ref 42.2–75.2)
NRBC BLD AUTO-RTO: 0 /100 WBCS — SIGNIFICANT CHANGE UP (ref 0–0)
PCO2 BLDV: 56 MMHG — HIGH (ref 42–55)
PH BLDV: 7.32 — SIGNIFICANT CHANGE UP (ref 7.32–7.43)
PLATELET # BLD AUTO: 187 K/UL — SIGNIFICANT CHANGE UP (ref 130–400)
PMV BLD: 10.6 FL — HIGH (ref 7.4–10.4)
PO2 BLDV: 35 MMHG — SIGNIFICANT CHANGE UP (ref 25–45)
POTASSIUM BLDV-SCNC: 5.1 MMOL/L — SIGNIFICANT CHANGE UP (ref 3.5–5.1)
POTASSIUM SERPL-MCNC: 6.5 MMOL/L — CRITICAL HIGH (ref 3.5–5)
POTASSIUM SERPL-SCNC: 6.5 MMOL/L — CRITICAL HIGH (ref 3.5–5)
PROT SERPL-MCNC: 7.1 G/DL — SIGNIFICANT CHANGE UP (ref 6–8)
PROTHROM AB SERPL-ACNC: 12.8 SEC — SIGNIFICANT CHANGE UP (ref 9.95–12.87)
RBC # BLD: 5.94 M/UL — SIGNIFICANT CHANGE UP (ref 4.7–6.1)
RBC # FLD: 21 % — HIGH (ref 11.5–14.5)
SAO2 % BLDV: 57.5 % — LOW (ref 67–88)
SODIUM SERPL-SCNC: 136 MMOL/L — SIGNIFICANT CHANGE UP (ref 135–146)
WBC # BLD: 12.15 K/UL — HIGH (ref 4.8–10.8)
WBC # FLD AUTO: 12.15 K/UL — HIGH (ref 4.8–10.8)

## 2025-06-09 PROCEDURE — 86140 C-REACTIVE PROTEIN: CPT

## 2025-06-09 PROCEDURE — 73620 X-RAY EXAM OF FOOT: CPT | Mod: 26,RT

## 2025-06-09 PROCEDURE — 83036 HEMOGLOBIN GLYCOSYLATED A1C: CPT

## 2025-06-09 PROCEDURE — 82803 BLOOD GASES ANY COMBINATION: CPT

## 2025-06-09 PROCEDURE — 87070 CULTURE OTHR SPECIMN AEROBIC: CPT

## 2025-06-09 PROCEDURE — 83735 ASSAY OF MAGNESIUM: CPT

## 2025-06-09 PROCEDURE — 85652 RBC SED RATE AUTOMATED: CPT

## 2025-06-09 PROCEDURE — 99223 1ST HOSP IP/OBS HIGH 75: CPT

## 2025-06-09 PROCEDURE — 87077 CULTURE AEROBIC IDENTIFY: CPT

## 2025-06-09 PROCEDURE — 82962 GLUCOSE BLOOD TEST: CPT

## 2025-06-09 PROCEDURE — 88304 TISSUE EXAM BY PATHOLOGIST: CPT

## 2025-06-09 PROCEDURE — 85014 HEMATOCRIT: CPT

## 2025-06-09 PROCEDURE — 93925 LOWER EXTREMITY STUDY: CPT | Mod: 26

## 2025-06-09 PROCEDURE — 82330 ASSAY OF CALCIUM: CPT

## 2025-06-09 PROCEDURE — 87205 SMEAR GRAM STAIN: CPT

## 2025-06-09 PROCEDURE — 87186 SC STD MICRODIL/AGAR DIL: CPT

## 2025-06-09 PROCEDURE — 85018 HEMOGLOBIN: CPT

## 2025-06-09 PROCEDURE — 85025 COMPLETE CBC W/AUTO DIFF WBC: CPT

## 2025-06-09 PROCEDURE — 73630 X-RAY EXAM OF FOOT: CPT | Mod: RT

## 2025-06-09 PROCEDURE — 36415 COLL VENOUS BLD VENIPUNCTURE: CPT

## 2025-06-09 PROCEDURE — 83605 ASSAY OF LACTIC ACID: CPT

## 2025-06-09 PROCEDURE — 99285 EMERGENCY DEPT VISIT HI MDM: CPT | Mod: FS

## 2025-06-09 PROCEDURE — 84132 ASSAY OF SERUM POTASSIUM: CPT

## 2025-06-09 PROCEDURE — 84295 ASSAY OF SERUM SODIUM: CPT

## 2025-06-09 PROCEDURE — 80053 COMPREHEN METABOLIC PANEL: CPT

## 2025-06-09 PROCEDURE — 93925 LOWER EXTREMITY STUDY: CPT

## 2025-06-09 PROCEDURE — 87075 CULTR BACTERIA EXCEPT BLOOD: CPT

## 2025-06-09 RX ORDER — DEXTROSE 50 % IN WATER 50 %
15 SYRINGE (ML) INTRAVENOUS ONCE
Refills: 0 | Status: DISCONTINUED | OUTPATIENT
Start: 2025-06-09 | End: 2025-06-10

## 2025-06-09 RX ORDER — DEXTROSE 50 % IN WATER 50 %
25 SYRINGE (ML) INTRAVENOUS ONCE
Refills: 0 | Status: DISCONTINUED | OUTPATIENT
Start: 2025-06-09 | End: 2025-06-10

## 2025-06-09 RX ORDER — INSULIN ASPART 100 [IU]/ML
0 INJECTION, SOLUTION INTRAVENOUS; SUBCUTANEOUS
Refills: 0 | DISCHARGE

## 2025-06-09 RX ORDER — GLUCAGON 3 MG/1
1 POWDER NASAL ONCE
Refills: 0 | Status: DISCONTINUED | OUTPATIENT
Start: 2025-06-09 | End: 2025-06-10

## 2025-06-09 RX ORDER — ACETAMINOPHEN 500 MG/5ML
650 LIQUID (ML) ORAL EVERY 6 HOURS
Refills: 0 | Status: DISCONTINUED | OUTPATIENT
Start: 2025-06-09 | End: 2025-06-10

## 2025-06-09 RX ORDER — RANOLAZINE 1000 MG/1
500 TABLET, FILM COATED, EXTENDED RELEASE ORAL
Refills: 0 | Status: DISCONTINUED | OUTPATIENT
Start: 2025-06-09 | End: 2025-06-10

## 2025-06-09 RX ORDER — VANCOMYCIN HCL IN 5 % DEXTROSE 1.5G/250ML
1500 PLASTIC BAG, INJECTION (ML) INTRAVENOUS EVERY 12 HOURS
Refills: 0 | Status: DISCONTINUED | OUTPATIENT
Start: 2025-06-09 | End: 2025-06-10

## 2025-06-09 RX ORDER — SERTRALINE 100 MG/1
50 TABLET, FILM COATED ORAL DAILY
Refills: 0 | Status: DISCONTINUED | OUTPATIENT
Start: 2025-06-09 | End: 2025-06-10

## 2025-06-09 RX ORDER — SODIUM CHLORIDE 9 G/1000ML
1000 INJECTION, SOLUTION INTRAVENOUS
Refills: 0 | Status: DISCONTINUED | OUTPATIENT
Start: 2025-06-09 | End: 2025-06-10

## 2025-06-09 RX ORDER — ENOXAPARIN SODIUM 100 MG/ML
40 INJECTION SUBCUTANEOUS EVERY 24 HOURS
Refills: 0 | Status: DISCONTINUED | OUTPATIENT
Start: 2025-06-09 | End: 2025-06-09

## 2025-06-09 RX ORDER — TIRZEPATIDE 7.5 MG/.5ML
5 INJECTION, SOLUTION SUBCUTANEOUS
Refills: 0 | DISCHARGE

## 2025-06-09 RX ORDER — NIFEDIPINE 30 MG
60 TABLET, EXTENDED RELEASE 24 HR ORAL EVERY 12 HOURS
Refills: 0 | Status: DISCONTINUED | OUTPATIENT
Start: 2025-06-09 | End: 2025-06-10

## 2025-06-09 RX ORDER — PREDNISONE 20 MG/1
5 TABLET ORAL DAILY
Refills: 0 | Status: DISCONTINUED | OUTPATIENT
Start: 2025-06-09 | End: 2025-06-10

## 2025-06-09 RX ORDER — TACROLIMUS 0.5 MG/1
0.5 CAPSULE ORAL
Refills: 0 | Status: DISCONTINUED | OUTPATIENT
Start: 2025-06-09 | End: 2025-06-10

## 2025-06-09 RX ORDER — MAGNESIUM, ALUMINUM HYDROXIDE 200-200 MG
30 TABLET,CHEWABLE ORAL EVERY 4 HOURS
Refills: 0 | Status: DISCONTINUED | OUTPATIENT
Start: 2025-06-09 | End: 2025-06-10

## 2025-06-09 RX ORDER — ATORVASTATIN CALCIUM 80 MG/1
20 TABLET, FILM COATED ORAL AT BEDTIME
Refills: 0 | Status: DISCONTINUED | OUTPATIENT
Start: 2025-06-09 | End: 2025-06-10

## 2025-06-09 RX ORDER — DEXTROSE 50 % IN WATER 50 %
12.5 SYRINGE (ML) INTRAVENOUS ONCE
Refills: 0 | Status: DISCONTINUED | OUTPATIENT
Start: 2025-06-09 | End: 2025-06-10

## 2025-06-09 RX ORDER — ASPIRIN 325 MG
81 TABLET ORAL DAILY
Refills: 0 | Status: DISCONTINUED | OUTPATIENT
Start: 2025-06-09 | End: 2025-06-10

## 2025-06-09 RX ORDER — INSULIN GLARGINE-YFGN 100 [IU]/ML
50 INJECTION, SOLUTION SUBCUTANEOUS AT BEDTIME
Refills: 0 | Status: DISCONTINUED | OUTPATIENT
Start: 2025-06-09 | End: 2025-06-10

## 2025-06-09 RX ORDER — VANCOMYCIN HCL IN 5 % DEXTROSE 1.5G/250ML
1000 PLASTIC BAG, INJECTION (ML) INTRAVENOUS ONCE
Refills: 0 | Status: COMPLETED | OUTPATIENT
Start: 2025-06-09 | End: 2025-06-09

## 2025-06-09 RX ORDER — MYCOPHENOLATE MOFETIL 500 MG/1
500 TABLET, FILM COATED ORAL
Refills: 0 | Status: DISCONTINUED | OUTPATIENT
Start: 2025-06-09 | End: 2025-06-10

## 2025-06-09 RX ORDER — DAPAGLIFLOZIN 5 MG/1
1 TABLET, FILM COATED ORAL
Refills: 0 | DISCHARGE

## 2025-06-09 RX ORDER — FOLIC ACID 1 MG/1
1 TABLET ORAL DAILY
Refills: 0 | Status: DISCONTINUED | OUTPATIENT
Start: 2025-06-09 | End: 2025-06-10

## 2025-06-09 RX ORDER — ISOSORBIDE MONONITRATE 60 MG/1
30 TABLET, EXTENDED RELEASE ORAL DAILY
Refills: 0 | Status: DISCONTINUED | OUTPATIENT
Start: 2025-06-09 | End: 2025-06-10

## 2025-06-09 RX ORDER — INSULIN LISPRO 100 U/ML
INJECTION, SOLUTION INTRAVENOUS; SUBCUTANEOUS
Refills: 0 | Status: DISCONTINUED | OUTPATIENT
Start: 2025-06-09 | End: 2025-06-10

## 2025-06-09 RX ORDER — DAPAGLIFLOZIN 5 MG/1
10 TABLET, FILM COATED ORAL DAILY
Refills: 0 | Status: DISCONTINUED | OUTPATIENT
Start: 2025-06-09 | End: 2025-06-10

## 2025-06-09 RX ORDER — ONDANSETRON HCL/PF 4 MG/2 ML
4 VIAL (ML) INJECTION EVERY 8 HOURS
Refills: 0 | Status: DISCONTINUED | OUTPATIENT
Start: 2025-06-09 | End: 2025-06-10

## 2025-06-09 RX ORDER — NIFEDIPINE 30 MG
1 TABLET, EXTENDED RELEASE 24 HR ORAL
Refills: 0 | DISCHARGE

## 2025-06-09 RX ORDER — MELATONIN 5 MG
3 TABLET ORAL AT BEDTIME
Refills: 0 | Status: DISCONTINUED | OUTPATIENT
Start: 2025-06-09 | End: 2025-06-10

## 2025-06-09 RX ADMIN — INSULIN GLARGINE-YFGN 50 UNIT(S): 100 INJECTION, SOLUTION SUBCUTANEOUS at 21:52

## 2025-06-09 RX ADMIN — MYCOPHENOLATE MOFETIL 500 MILLIGRAM(S): 500 TABLET, FILM COATED ORAL at 18:47

## 2025-06-09 RX ADMIN — Medication 300 MILLIGRAM(S): at 18:49

## 2025-06-09 RX ADMIN — TACROLIMUS 0.5 MILLIGRAM(S): 0.5 CAPSULE ORAL at 20:10

## 2025-06-09 RX ADMIN — Medication 250 MILLIGRAM(S): at 13:01

## 2025-06-09 RX ADMIN — Medication 60 MILLIGRAM(S): at 18:48

## 2025-06-09 RX ADMIN — RANOLAZINE 500 MILLIGRAM(S): 1000 TABLET, FILM COATED, EXTENDED RELEASE ORAL at 18:48

## 2025-06-09 RX ADMIN — ATORVASTATIN CALCIUM 20 MILLIGRAM(S): 80 TABLET, FILM COATED ORAL at 21:49

## 2025-06-09 NOTE — ED PROVIDER NOTE - OBJECTIVE STATEMENT
75-year-old male presents to the ED for evaluation of foot ulcers.  Patient is being followed by podiatry.  Patient recently saw his podiatrist and was sent to the ED for admission.

## 2025-06-09 NOTE — PATIENT PROFILE ADULT - FALL HARM RISK - UNIVERSAL INTERVENTIONS
Bed in lowest position, wheels locked, appropriate side rails in place/Call bell, personal items and telephone in reach/Instruct patient to call for assistance before getting out of bed or chair/Non-slip footwear when patient is out of bed/[place] to call system/Physically safe environment - no spills, clutter or unnecessary equipment/Purposeful Proactive Rounding/Room/bathroom lighting operational, light cord in reach

## 2025-06-09 NOTE — H&P ADULT - NSHPSOCIALHISTORY_GEN_ALL_CORE
Former smoker, quit 20 years ago, occasional drinking  Denies any illicit drug use  Resides at home w. wife

## 2025-06-09 NOTE — ED PROVIDER NOTE - CLINICAL SUMMARY MEDICAL DECISION MAKING FREE TEXT BOX
Labs and x-ray was obtained.  Patient started on antibiotics.  Podiatry made aware and saw patient in ED.  Patient is aware that he will be admitted.

## 2025-06-09 NOTE — ED PROVIDER NOTE - ATTENDING APP SHARED VISIT CONTRIBUTION OF CARE
74-year-old male past medical history noted including diabetes, hypertension, has pacemaker, coronary disease, presents for evaluation of foot ulcer to the right toe.  No fevers.  Patient completed 3 weeks of antibiotics.  Sent in for podiatrist for amputation.  On exam patient in NAD, AAO x 3, positive swelling with erythema and also to right middle toe, positive drainage

## 2025-06-09 NOTE — H&P ADULT - NSHPPHYSICALEXAM_GEN_ALL_CORE
Vital Signs Last 24 Hrs  T(C): 36.7 (09 Jun 2025 11:48), Max: 36.7 (09 Jun 2025 11:48)  T(F): 98.1 (09 Jun 2025 11:48), Max: 98.1 (09 Jun 2025 11:48)  HR: 57 (09 Jun 2025 11:48) (57 - 57)  BP: 159/81 (09 Jun 2025 11:48) (159/81 - 159/81)  RR: 18 (09 Jun 2025 11:48) (18 - 18)  SpO2: 100% (09 Jun 2025 11:48) (100% - 100%)    Parameters below as of 09 Jun 2025 11:48  Patient On (Oxygen Delivery Method): room air     Left foot third digit there is an ulcer noted positive drainage positive erythema      GENERAL:  75y/o Male NAD, resting comfortably.  HEAD:  Atraumatic, Normocephalic  EYES: EOMI, conjunctiva and sclera clear  NECK: Supple  CHEST/LUNG: CTA  HEART: S1&S2  ABDOMEN: Soft, Nontender, Nondistended x 4 quadrants; Bowel sounds present  EXTREMITIES:   R foot third digit ulcer w/ drainage, s/p left and right foot 2nd digit amputation. faint peripheral pulse, skin warm to touch, no edema, no calf tenderness, sensation intact b/l   PSYCH: AAOx3, cooperative, appropriate  NEUROLOGY: WNL  SKIN: WNL

## 2025-06-09 NOTE — H&P ADULT - NSHPLABSRESULTS_GEN_ALL_CORE
17.0   12.15 )-----------( 187      ( 09 Jun 2025 12:05 )             52.2       06-09    136  |  105  |  21[H]  ----------------------------<  172[H]  6.5[HH]   |  20  |  1.0    Ca    8.8      09 Jun 2025 14:15    TPro  7.1  /  Alb  4.2  /  TBili  0.4  /  DBili  x   /  AST  30  /  ALT  26  /  AlkPhos  92  06-09              Urinalysis Basic - ( 09 Jun 2025 14:15 )    Color: x / Appearance: x / SG: x / pH: x  Gluc: 172 mg/dL / Ketone: x  / Bili: x / Urobili: x   Blood: x / Protein: x / Nitrite: x   Leuk Esterase: x / RBC: x / WBC x   Sq Epi: x / Non Sq Epi: x / Bacteria: x        PT/INR - ( 09 Jun 2025 12:05 )   PT: 12.80 sec;   INR: 1.08 ratio         PTT - ( 09 Jun 2025 12:05 )  PTT:44.6 sec

## 2025-06-09 NOTE — H&P ADULT - HISTORY OF PRESENT ILLNESS
Patient is a 75yo male w/ pmhx HTN, HLD, DMII, ESRD s/p L renal transplant, CAD s/p CABG, cardiac arrythmia s/p AICD, PAD s/p LLE PTA, s/p left and right foot 2nd toe amputation sent to the ED by his podiatrist Dr. Loera for DFU, plan for OR tomorrow w/ podiatry. Patient denies fever, chills, n/v, chest pain, sob, abdominal pain, changes in BMs, urinary sxs.

## 2025-06-09 NOTE — ED PROVIDER NOTE - PHYSICAL EXAMINATION
--EXAM--  VITAL SIGNS: I have reviewed vs documented at present.  CONSTITUTIONAL: Well-developed; well-nourished; in no acute distress.   SKIN: Warm and dry, no acute rash.     NECK: Supple; non tender.  CARD: S1, S2, Regular rate and rhythm.   RESP: No wheezes, rales or rhonchi.  ABD: Normal bowel sounds; soft; non-distended; non-tender.  EXT: Left foot third digit there is an ulcer noted positive drainage positive erythema

## 2025-06-09 NOTE — H&P ADULT - ASSESSMENT
Patient is a 73yo male w/ pmhx HTN, HLD, DMII, ESRD s/p L renal transplant, CAD s/p CABG, cardiac arrythmia s/p AICD, PAD s/p LLE PTA, s/p left and right foot 2nd toe amputation sent to the ED by his podiatrist Dr. Loera for DFU, plan for OR tomorrow w/ podiatry.     #Diabetic Foot ulcer  - c/w IV abx - Vancomycin  - FU BCx  - Podiatry consult, plan for OR tomorrow w/ podiatry per patient   - NPO P MN   - Weight bearing per podiatry   - ID Consult   - Holding Eliquis for possible OR tomorrow     #CAD s/p cardiac bypass  #Cardiac arrythmia s/p AICD   #PAD s/p LLE PTA, s/p left and right foot 2nd toe amputation   - c/w  ASA 81mg QD, Imdur 30mg QD, Ranexa 500mg BID  - Will hold Eliquis for OR     #DMII   - c/w home Lantus 50u QHS w/ ISS  - will home home Mounjaro (last dose 06/08) and Fiasp   - Monitor FS    #HLD  - c/w Lipitor 20mg QD     #HTN  - c/w Nifedipine 60mg BID   - Monitor BP     #ESRD s/p L renal transplant  - renal function t baseline   - resume home prednisone 5mg QD, prograf 0.5mg BID, Cellcept 500mg BID  - Monitor RF, avoid nephrotoxic agents     #Depression   - c/w zoloft 50mg QD     Diet: CC, NPO P mn  Activity: AAT   VTE PPX: SCDs  Dispo: from home     Plan Discussed and approved by attending on call.       Patient is a 73yo male w/ pmhx HTN, HLD, DMII, ESRD s/p L renal transplant, CAD s/p CABG, cardiac arrythmia s/p AICD, PAD s/p LLE PTA, s/p left and right foot 2nd toe amputation sent to the ED by his podiatrist Dr. Loera for DFU, plan for OR tomorrow w/ podiatry.     #Diabetic Foot ulcer  - c/w IV abx - Vancomycin  - FU BCx  - Podiatry consult, plan for OR tomorrow w/ podiatry per patient   - NPO P MN   - FU official XR read   - Weight bearing per podiatry   - ID Consult   - Holding Eliquis for possible OR tomorrow     #Hyperkalemia - hemolyzed   - K in VBG - 5.1    #CAD s/p cardiac bypass  #Cardiac arrythmia s/p AICD   #PAD s/p LLE PTA, s/p left and right foot 2nd toe amputation   - c/w  ASA 81mg QD, Imdur 30mg QD, Ranexa 500mg BID  - Will hold Eliquis for OR     #DMII   - c/w home Lantus 50u QHS w/ ISS  - will home home Mounjaro (last dose 06/08) and Fiasp   - Monitor FS    #HLD  - c/w Lipitor 20mg QD     #HTN  - c/w Nifedipine 60mg BID   - Monitor BP     #ESRD s/p L renal transplant  - renal function t baseline   - resume home prednisone 5mg QD, prograf 0.5mg BID, Cellcept 500mg BID  - Monitor RF, avoid nephrotoxic agents     #Depression   - c/w zoloft 50mg QD     Diet: CC, NPO P mn  Activity: AAT   VTE PPX: SCDs  Dispo: from home     Plan Discussed and approved by attending on call.       Patient is a 73yo male w/ pmhx HTN, HLD, DMII, ESRD s/p L renal transplant, CAD s/p CABG, cardiac arrythmia s/p AICD, PAD s/p LLE PTA, s/p left and right foot 2nd toe amputation sent to the ED by his podiatrist Dr. Loera for DFU, plan for OR tomorrow w/ podiatry.     #Diabetic Foot ulcer  - c/w IV abx - Vancomycin  - Local Wound Care; Wound Flushed w/ NS; Wound Packed w/ xeroform / DSD / Kerlix   - NPO P MN   - FU BCx  - Lower Extremity Arterial Duplex B/L  - FU official XR read   - Weight bearing per podiatry   - Holding Eliquis for possible OR tomorrow   - ID Consult   - Podiatry consult, plan for OR tomorrow w/ podiatry per patient     #Hyperkalemia - hemolyzed   - K in VBG - 5.1    #CAD s/p cardiac bypass  #Cardiac arrythmia s/p AICD   #PAD s/p LLE PTA, s/p left and right foot 2nd toe amputation   - c/w  ASA 81mg QD, Imdur 30mg QD, Ranexa 500mg BID  - Will hold Eliquis for OR     #DMII   - c/w home Lantus 50u QHS w/ ISS  - will home home Mounjaro (last dose 06/08) and Fiasp   - Monitor FS    #HLD  - c/w Lipitor 20mg QD     #HTN  - c/w Nifedipine 60mg BID   - Monitor BP     #ESRD s/p L renal transplant  - renal function t baseline   - resume home prednisone 5mg QD, prograf 0.5mg BID, Cellcept 500mg BID  - Monitor RF, avoid nephrotoxic agents     #Depression   - c/w zoloft 50mg QD     Diet: CC, NPO P mn  Activity: AAT   VTE PPX: SCDs  Dispo: from home     Plan Discussed and approved by attending on call.       Patient is a 73yo male w/ pmhx HTN, HLD, DMII, ESRD s/p L renal transplant, CAD s/p CABG, cardiac arrythmia s/p AICD, PAD s/p LLE PTA, s/p left and right foot 2nd toe amputation sent to the ED by his podiatrist Dr. Loera for DFU, plan for OR tomorrow w/ podiatry.     #Diabetic Foot ulcer  - c/w IV abx - Vancomycin  - Local Wound Care; Wound Flushed w/ NS; Wound Packed w/ xeroform / DSD / Kerlix   - NPO P MN   - FU BCx  - Lower Extremity Arterial Duplex B/L  - FU official XR read   - Weight bearing per podiatry   - Holding Eliquis for possible OR tomorrow   - ID Consult   - Podiatry consult, plan for OR tomorrow w/ podiatry per patient     #Hyperkalemia - hemolyzed   - K in VBG - 5.1    #CAD s/p cardiac bypass  #Cardiac arrythmia s/p AICD   #PAD s/p LLE PTA, s/p left and right foot 2nd toe amputation   - c/w  ASA 81mg QD, Imdur 30mg QD, Ranexa 500mg BID  - Will hold Eliquis for OR     #DMII   - c/w home Lantus 50u QHS w/ ISS  - will home home Mounjaro (last dose 06/08) and Fiasp   - Monitor FS    #HLD  - c/w Lipitor 20mg QD     #HTN  - c/w Nifedipine 60mg BID   - Monitor BP     #ESRD s/p L renal transplant  - renal function t baseline   - resume home prednisone 5mg QD, prograf 0.5mg BID, Cellcept 500mg BID  - Monitor RF, avoid nephrotoxic agents     #Depression   - c/w zoloft 50mg QD       Diet: CC, NPO P mn  Activity: AAT   VTE PPX: SCDs  Dispo: from home     Plan Discussed and approved by attending on call.       Patient is a 75yo male w/ pmhx HTN, HLD, DMII, ESRD s/p L renal transplant, CAD s/p CABG, cardiac arrythmia s/p AICD, PAD s/p LLE PTA, s/p left and right foot 2nd toe amputation sent to the ED by his podiatrist Dr. Loera for DFU, plan for OR tomorrow w/ podiatry.     #Diabetic Foot ulcer  - c/w IV abx - Vancomycin  - Local Wound Care; Wound Flushed w/ NS; Wound Packed w/ xeroform / DSD / Kerlix   - NPO P MN   - FU BCx  - Lower Extremity Arterial Duplex B/L  - FU official XR read   - Weight bearing per podiatry   - Holding Eliquis for possible OR tomorrow   - ID Consult   - Podiatry consult, plan for OR tomorrow w/ podiatry per patient   Pt w/ mets > 4  - pt is a moderate risk pt for a low/intermediate risk procedure    #Hyperkalemia - hemolyzed   - K in VBG - 5.1    #CAD s/p cardiac bypass  #Cardiac arrythmia s/p AICD   #PAD s/p LLE PTA, s/p left and right foot 2nd toe amputation   - c/w  ASA 81mg QD, Imdur 30mg QD, Ranexa 500mg BID  - Will hold Eliquis for OR     #DMII   - c/w home Lantus 50u QHS w/ ISS  - will home home Mounjaro (last dose 06/08) and Fiasp   - Monitor FS    #HLD  - c/w Lipitor 20mg QD     #HTN  - c/w Nifedipine 60mg BID   - Monitor BP     #ESRD s/p L renal transplant  - renal function t baseline   - resume home prednisone 5mg QD, prograf 0.5mg BID, Cellcept 500mg BID  - Monitor RF, avoid nephrotoxic agents     #Depression   - c/w zoloft 50mg QD       Diet: CC, NPO P mn  Activity: AAT   VTE PPX: SCDs  Dispo: from home     Plan Discussed and approved by attending on call.

## 2025-06-09 NOTE — CONSULT NOTE ADULT - SUBJECTIVE AND OBJECTIVE BOX
Podiatry Consult Note    Subjective:  NEMO ALLEN  Seen Bedside 74y Male  .   Patient is a 74y old  Male who presents with a chief complaint of right foot ulcer  HPI:  Patient is a 73yo male w/ pmhx HTN, HLD, DMII, ESRD s/p L renal transplant, CAD s/p CABG, cardiac arrythmia s/p AICD, PAD s/p LLE PTA, s/p left and right foot 2nd toe amputation sent to the ED by his podiatrist Dr. Loera for DFU, plan for OR tomorrow w/ podiatry. Patient denies fever, chills, n/v, chest pain, sob, abdominal pain, changes in BMs, urinary sxs.         (09 Jun 2025 17:28)      Past Medical History and Surgical History  PAST MEDICAL & SURGICAL HISTORY:  History of renal transplant      Hypertension      HLD (hyperlipidemia)      HF (heart failure)      CAD (coronary artery disease)      History of cardiac defibrillator placement      H/O kidney transplant      S/P CABG (coronary artery bypass graft)           Review of Systems:  [X] Ten point review of systems is otherwise negative except as noted     Objective:  Vital Signs Last 24 Hrs  T(C): 36.7 (09 Jun 2025 11:48), Max: 36.7 (09 Jun 2025 11:48)  T(F): 98.1 (09 Jun 2025 11:48), Max: 98.1 (09 Jun 2025 11:48)  HR: 57 (09 Jun 2025 11:48) (57 - 57)  BP: 159/81 (09 Jun 2025 11:48) (159/81 - 159/81)  BP(mean): --  RR: 18 (09 Jun 2025 11:48) (18 - 18)  SpO2: 100% (09 Jun 2025 11:48) (100% - 100%)    Parameters below as of 09 Jun 2025 11:48  Patient On (Oxygen Delivery Method): room air                            17.0   12.15 )-----------( 187      ( 09 Jun 2025 12:05 )             52.2                 06-09    136  |  105  |  21[H]  ----------------------------<  172[H]  6.5[HH]   |  20  |  1.0    Ca    8.8      09 Jun 2025 14:15    TPro  7.1  /  Alb  4.2  /  TBili  0.4  /  DBili  x   /  AST  30  /  ALT  26  /  AlkPhos  92  06-09        Physical Exam - Right Lower Extremity Focused:   Derm: Ulcer to distal fourth digit with necrotic cap and exposed bone, hyperkeratotic rim, no active drainage, erythema and swelling present at digit  Vascular: DP and PT Pulses Diminished; Foot is Warm to Warm to the touch; Capillary Refill Time < 3 Seconds;    Neuro: Protective Sensation Diminished / Moderately Neuropathic   MSK: Pain On Palpation at Wound Site     Assessment:  Right foot DFU  Osteomyelitis    Plan:  Chart reviewed and Patient evaluated. All Questions and Concerns Addressed and Answered  XR Imaging  Foot; Pending Results  Local Wound Care; Wound Flushed w/ NS; Wound Packed w/ xeroform / DSD / Kerlix   Weight Bearing Status; WBAT RLE  Recommend; Lower Extremity Arterial Duplex B/L  ID consult pending; follow up after surgical wound cultures  Plan for debridement and partial toe amputation right foot Tues 6/10  NPO midnight 6/9   Optimize lab values and medical clearance prior to OR  Discussed Plan w/ Dr. Dumont    Podiatry

## 2025-06-09 NOTE — PATIENT PROFILE ADULT - FUNCTIONAL ASSESSMENT - BASIC MOBILITY SECTION LABEL
36 yo M, no cardiac RFs, c/o 1wk CP.  Worse:  sometimes worse with exertion.  Associated Sx - no f/c, no  No cough, no back pain, no jaw/arm pain, no diaphoresis.  Duration: 1wk.  No FHx CAD.
.

## 2025-06-10 ENCOUNTER — RESULT REVIEW (OUTPATIENT)
Age: 75
End: 2025-06-10

## 2025-06-10 LAB
ALBUMIN SERPL ELPH-MCNC: 4.2 G/DL — SIGNIFICANT CHANGE UP (ref 3.5–5.2)
ALP SERPL-CCNC: 106 U/L — SIGNIFICANT CHANGE UP (ref 30–115)
ALT FLD-CCNC: 26 U/L — SIGNIFICANT CHANGE UP (ref 0–41)
ANION GAP SERPL CALC-SCNC: 10 MMOL/L — SIGNIFICANT CHANGE UP (ref 7–14)
AST SERPL-CCNC: 23 U/L — SIGNIFICANT CHANGE UP (ref 0–41)
BASOPHILS # BLD AUTO: 0.03 K/UL — SIGNIFICANT CHANGE UP (ref 0–0.2)
BASOPHILS NFR BLD AUTO: 0.3 % — SIGNIFICANT CHANGE UP (ref 0–1)
BILIRUB SERPL-MCNC: 0.4 MG/DL — SIGNIFICANT CHANGE UP (ref 0.2–1.2)
BUN SERPL-MCNC: 18 MG/DL — SIGNIFICANT CHANGE UP (ref 10–20)
CALCIUM SERPL-MCNC: 9.5 MG/DL — SIGNIFICANT CHANGE UP (ref 8.4–10.5)
CHLORIDE SERPL-SCNC: 105 MMOL/L — SIGNIFICANT CHANGE UP (ref 98–110)
CO2 SERPL-SCNC: 25 MMOL/L — SIGNIFICANT CHANGE UP (ref 17–32)
CREAT SERPL-MCNC: 1.2 MG/DL — SIGNIFICANT CHANGE UP (ref 0.7–1.5)
CRP SERPL-MCNC: <3 MG/L — SIGNIFICANT CHANGE UP
EGFR: 64 ML/MIN/1.73M2 — SIGNIFICANT CHANGE UP
EGFR: 64 ML/MIN/1.73M2 — SIGNIFICANT CHANGE UP
EOSINOPHIL # BLD AUTO: 0.13 K/UL — SIGNIFICANT CHANGE UP (ref 0–0.7)
EOSINOPHIL NFR BLD AUTO: 1.2 % — SIGNIFICANT CHANGE UP (ref 0–8)
ERYTHROCYTE [SEDIMENTATION RATE] IN BLOOD: 0 MM/HR — SIGNIFICANT CHANGE UP (ref 0–15)
GLUCOSE BLDC GLUCOMTR-MCNC: 104 MG/DL — HIGH (ref 70–99)
GLUCOSE BLDC GLUCOMTR-MCNC: 123 MG/DL — HIGH (ref 70–99)
GLUCOSE BLDC GLUCOMTR-MCNC: 131 MG/DL — HIGH (ref 70–99)
GLUCOSE BLDC GLUCOMTR-MCNC: 133 MG/DL — HIGH (ref 70–99)
GLUCOSE SERPL-MCNC: 135 MG/DL — HIGH (ref 70–99)
HCT VFR BLD CALC: 57 % — HIGH (ref 42–52)
HGB BLD-MCNC: 18.2 G/DL — HIGH (ref 14–18)
IMM GRANULOCYTES NFR BLD AUTO: 0.3 % — SIGNIFICANT CHANGE UP (ref 0.1–0.3)
LYMPHOCYTES # BLD AUTO: 2.77 K/UL — SIGNIFICANT CHANGE UP (ref 1.2–3.4)
LYMPHOCYTES # BLD AUTO: 26.2 % — SIGNIFICANT CHANGE UP (ref 20.5–51.1)
MAGNESIUM SERPL-MCNC: 2 MG/DL — SIGNIFICANT CHANGE UP (ref 1.8–2.4)
MCHC RBC-ENTMCNC: 28.3 PG — SIGNIFICANT CHANGE UP (ref 27–31)
MCHC RBC-ENTMCNC: 31.9 G/DL — LOW (ref 32–37)
MCV RBC AUTO: 88.6 FL — SIGNIFICANT CHANGE UP (ref 80–94)
MONOCYTES # BLD AUTO: 0.95 K/UL — HIGH (ref 0.1–0.6)
MONOCYTES NFR BLD AUTO: 9 % — SIGNIFICANT CHANGE UP (ref 1.7–9.3)
NEUTROPHILS # BLD AUTO: 6.68 K/UL — HIGH (ref 1.4–6.5)
NEUTROPHILS NFR BLD AUTO: 63 % — SIGNIFICANT CHANGE UP (ref 42.2–75.2)
NRBC BLD AUTO-RTO: 0 /100 WBCS — SIGNIFICANT CHANGE UP (ref 0–0)
PLATELET # BLD AUTO: 156 K/UL — SIGNIFICANT CHANGE UP (ref 130–400)
PMV BLD: 9.7 FL — SIGNIFICANT CHANGE UP (ref 7.4–10.4)
POTASSIUM SERPL-MCNC: 4.8 MMOL/L — SIGNIFICANT CHANGE UP (ref 3.5–5)
POTASSIUM SERPL-SCNC: 4.8 MMOL/L — SIGNIFICANT CHANGE UP (ref 3.5–5)
PROT SERPL-MCNC: 7.2 G/DL — SIGNIFICANT CHANGE UP (ref 6–8)
RBC # BLD: 6.43 M/UL — HIGH (ref 4.7–6.1)
RBC # FLD: 20.8 % — HIGH (ref 11.5–14.5)
SODIUM SERPL-SCNC: 140 MMOL/L — SIGNIFICANT CHANGE UP (ref 135–146)
WBC # BLD: 10.59 K/UL — SIGNIFICANT CHANGE UP (ref 4.8–10.8)
WBC # FLD AUTO: 10.59 K/UL — SIGNIFICANT CHANGE UP (ref 4.8–10.8)

## 2025-06-10 PROCEDURE — 88304 TISSUE EXAM BY PATHOLOGIST: CPT | Mod: 26

## 2025-06-10 PROCEDURE — 99233 SBSQ HOSP IP/OBS HIGH 50: CPT

## 2025-06-10 PROCEDURE — 73630 X-RAY EXAM OF FOOT: CPT | Mod: 26,RT

## 2025-06-10 RX ORDER — TACROLIMUS 0.5 MG/1
0.5 CAPSULE ORAL
Refills: 0 | Status: DISCONTINUED | OUTPATIENT
Start: 2025-06-10 | End: 2025-06-11

## 2025-06-10 RX ORDER — NIFEDIPINE 30 MG
60 TABLET, EXTENDED RELEASE 24 HR ORAL EVERY 12 HOURS
Refills: 0 | Status: DISCONTINUED | OUTPATIENT
Start: 2025-06-10 | End: 2025-06-11

## 2025-06-10 RX ORDER — AMOXICILLIN AND CLAVULANATE POTASSIUM 500; 125 MG/1; MG/1
1 TABLET, FILM COATED ORAL EVERY 12 HOURS
Refills: 0 | Status: DISCONTINUED | OUTPATIENT
Start: 2025-06-10 | End: 2025-06-11

## 2025-06-10 RX ORDER — SERTRALINE 100 MG/1
50 TABLET, FILM COATED ORAL DAILY
Refills: 0 | Status: DISCONTINUED | OUTPATIENT
Start: 2025-06-10 | End: 2025-06-11

## 2025-06-10 RX ORDER — DEXTROSE 50 % IN WATER 50 %
15 SYRINGE (ML) INTRAVENOUS ONCE
Refills: 0 | Status: DISCONTINUED | OUTPATIENT
Start: 2025-06-10 | End: 2025-06-11

## 2025-06-10 RX ORDER — ACETAMINOPHEN 500 MG/5ML
975 LIQUID (ML) ORAL EVERY 8 HOURS
Refills: 0 | Status: DISCONTINUED | OUTPATIENT
Start: 2025-06-10 | End: 2025-06-11

## 2025-06-10 RX ORDER — FOLIC ACID 1 MG/1
1 TABLET ORAL DAILY
Refills: 0 | Status: DISCONTINUED | OUTPATIENT
Start: 2025-06-10 | End: 2025-06-11

## 2025-06-10 RX ORDER — PREDNISONE 20 MG/1
5 TABLET ORAL DAILY
Refills: 0 | Status: DISCONTINUED | OUTPATIENT
Start: 2025-06-10 | End: 2025-06-11

## 2025-06-10 RX ORDER — GLUCAGON 3 MG/1
1 POWDER NASAL ONCE
Refills: 0 | Status: DISCONTINUED | OUTPATIENT
Start: 2025-06-10 | End: 2025-06-11

## 2025-06-10 RX ORDER — MELATONIN 5 MG
3 TABLET ORAL AT BEDTIME
Refills: 0 | Status: DISCONTINUED | OUTPATIENT
Start: 2025-06-10 | End: 2025-06-11

## 2025-06-10 RX ORDER — MYCOPHENOLATE MOFETIL 500 MG/1
500 TABLET, FILM COATED ORAL
Refills: 0 | Status: DISCONTINUED | OUTPATIENT
Start: 2025-06-10 | End: 2025-06-11

## 2025-06-10 RX ORDER — LIDOCAINE HYDROCHLORIDE 20 MG/ML
1 JELLY TOPICAL EVERY 24 HOURS
Refills: 0 | Status: DISCONTINUED | OUTPATIENT
Start: 2025-06-10 | End: 2025-06-11

## 2025-06-10 RX ORDER — MAGNESIUM, ALUMINUM HYDROXIDE 200-200 MG
30 TABLET,CHEWABLE ORAL EVERY 4 HOURS
Refills: 0 | Status: DISCONTINUED | OUTPATIENT
Start: 2025-06-10 | End: 2025-06-11

## 2025-06-10 RX ORDER — ONDANSETRON HCL/PF 4 MG/2 ML
4 VIAL (ML) INJECTION ONCE
Refills: 0 | Status: DISCONTINUED | OUTPATIENT
Start: 2025-06-10 | End: 2025-06-10

## 2025-06-10 RX ORDER — INSULIN GLARGINE-YFGN 100 [IU]/ML
50 INJECTION, SOLUTION SUBCUTANEOUS AT BEDTIME
Refills: 0 | Status: DISCONTINUED | OUTPATIENT
Start: 2025-06-10 | End: 2025-06-11

## 2025-06-10 RX ORDER — ASPIRIN 325 MG
81 TABLET ORAL DAILY
Refills: 0 | Status: DISCONTINUED | OUTPATIENT
Start: 2025-06-10 | End: 2025-06-11

## 2025-06-10 RX ORDER — INSULIN LISPRO 100 U/ML
INJECTION, SOLUTION INTRAVENOUS; SUBCUTANEOUS
Refills: 0 | Status: DISCONTINUED | OUTPATIENT
Start: 2025-06-10 | End: 2025-06-11

## 2025-06-10 RX ORDER — ATORVASTATIN CALCIUM 80 MG/1
20 TABLET, FILM COATED ORAL AT BEDTIME
Refills: 0 | Status: DISCONTINUED | OUTPATIENT
Start: 2025-06-10 | End: 2025-06-11

## 2025-06-10 RX ORDER — DAPAGLIFLOZIN 5 MG/1
10 TABLET, FILM COATED ORAL DAILY
Refills: 0 | Status: DISCONTINUED | OUTPATIENT
Start: 2025-06-10 | End: 2025-06-11

## 2025-06-10 RX ORDER — HYDROMORPHONE/SOD CHLOR,ISO/PF 2 MG/10 ML
1 SYRINGE (ML) INJECTION
Refills: 0 | Status: DISCONTINUED | OUTPATIENT
Start: 2025-06-10 | End: 2025-06-10

## 2025-06-10 RX ORDER — DOXYCYCLINE HYCLATE 100 MG
100 TABLET ORAL EVERY 12 HOURS
Refills: 0 | Status: DISCONTINUED | OUTPATIENT
Start: 2025-06-10 | End: 2025-06-11

## 2025-06-10 RX ORDER — HYDROMORPHONE/SOD CHLOR,ISO/PF 2 MG/10 ML
0.5 SYRINGE (ML) INJECTION
Refills: 0 | Status: DISCONTINUED | OUTPATIENT
Start: 2025-06-10 | End: 2025-06-10

## 2025-06-10 RX ORDER — SODIUM CHLORIDE 9 G/1000ML
1000 INJECTION, SOLUTION INTRAVENOUS
Refills: 0 | Status: DISCONTINUED | OUTPATIENT
Start: 2025-06-10 | End: 2025-06-10

## 2025-06-10 RX ORDER — DEXTROSE 50 % IN WATER 50 %
25 SYRINGE (ML) INTRAVENOUS ONCE
Refills: 0 | Status: DISCONTINUED | OUTPATIENT
Start: 2025-06-10 | End: 2025-06-11

## 2025-06-10 RX ORDER — SODIUM CHLORIDE 9 G/1000ML
1000 INJECTION, SOLUTION INTRAVENOUS
Refills: 0 | Status: DISCONTINUED | OUTPATIENT
Start: 2025-06-10 | End: 2025-06-11

## 2025-06-10 RX ORDER — RANOLAZINE 1000 MG/1
500 TABLET, FILM COATED, EXTENDED RELEASE ORAL
Refills: 0 | Status: DISCONTINUED | OUTPATIENT
Start: 2025-06-10 | End: 2025-06-11

## 2025-06-10 RX ADMIN — RANOLAZINE 500 MILLIGRAM(S): 1000 TABLET, FILM COATED, EXTENDED RELEASE ORAL at 08:20

## 2025-06-10 RX ADMIN — SERTRALINE 50 MILLIGRAM(S): 100 TABLET, FILM COATED ORAL at 11:46

## 2025-06-10 RX ADMIN — FOLIC ACID 1 MILLIGRAM(S): 1 TABLET ORAL at 11:45

## 2025-06-10 RX ADMIN — Medication 975 MILLIGRAM(S): at 23:17

## 2025-06-10 RX ADMIN — RANOLAZINE 500 MILLIGRAM(S): 1000 TABLET, FILM COATED, EXTENDED RELEASE ORAL at 18:01

## 2025-06-10 RX ADMIN — MYCOPHENOLATE MOFETIL 500 MILLIGRAM(S): 500 TABLET, FILM COATED ORAL at 18:01

## 2025-06-10 RX ADMIN — Medication 300 MILLIGRAM(S): at 06:00

## 2025-06-10 RX ADMIN — ATORVASTATIN CALCIUM 20 MILLIGRAM(S): 80 TABLET, FILM COATED ORAL at 21:46

## 2025-06-10 RX ADMIN — DAPAGLIFLOZIN 10 MILLIGRAM(S): 5 TABLET, FILM COATED ORAL at 11:44

## 2025-06-10 RX ADMIN — PREDNISONE 5 MILLIGRAM(S): 20 TABLET ORAL at 08:21

## 2025-06-10 RX ADMIN — Medication 30 MILLILITER(S): at 20:06

## 2025-06-10 RX ADMIN — INSULIN GLARGINE-YFGN 50 UNIT(S): 100 INJECTION, SOLUTION SUBCUTANEOUS at 21:49

## 2025-06-10 RX ADMIN — Medication 30 MILLILITER(S): at 16:28

## 2025-06-10 RX ADMIN — Medication 81 MILLIGRAM(S): at 18:01

## 2025-06-10 RX ADMIN — ISOSORBIDE MONONITRATE 30 MILLIGRAM(S): 60 TABLET, EXTENDED RELEASE ORAL at 11:45

## 2025-06-10 RX ADMIN — Medication 975 MILLIGRAM(S): at 21:46

## 2025-06-10 RX ADMIN — Medication 1 APPLICATION(S): at 06:03

## 2025-06-10 RX ADMIN — Medication 60 MILLIGRAM(S): at 08:20

## 2025-06-10 RX ADMIN — AMOXICILLIN AND CLAVULANATE POTASSIUM 1 TABLET(S): 500; 125 TABLET, FILM COATED ORAL at 20:27

## 2025-06-10 RX ADMIN — MYCOPHENOLATE MOFETIL 500 MILLIGRAM(S): 500 TABLET, FILM COATED ORAL at 08:20

## 2025-06-10 RX ADMIN — TACROLIMUS 0.5 MILLIGRAM(S): 0.5 CAPSULE ORAL at 20:27

## 2025-06-10 RX ADMIN — Medication 60 MILLIGRAM(S): at 18:00

## 2025-06-10 RX ADMIN — TACROLIMUS 0.5 MILLIGRAM(S): 0.5 CAPSULE ORAL at 08:21

## 2025-06-10 RX ADMIN — Medication 30 MILLILITER(S): at 08:20

## 2025-06-10 RX ADMIN — Medication 100 MILLIGRAM(S): at 20:27

## 2025-06-10 NOTE — PROGRESS NOTE ADULT - SUBJECTIVE AND OBJECTIVE BOX
Patient is a 74y old  Male who presents with a chief complaint of         MEDICATIONS  (STANDING):  aspirin enteric coated 81 milliGRAM(s) Oral daily  atorvastatin 20 milliGRAM(s) Oral at bedtime  chlorhexidine 2% Cloths 1 Application(s) Topical <User Schedule>  dapagliflozin 10 milliGRAM(s) Oral daily  dextrose 5%. 1000 milliLiter(s) (100 mL/Hr) IV Continuous <Continuous>  dextrose 5%. 1000 milliLiter(s) (50 mL/Hr) IV Continuous <Continuous>  dextrose 50% Injectable 25 Gram(s) IV Push once  dextrose 50% Injectable 12.5 Gram(s) IV Push once  dextrose 50% Injectable 25 Gram(s) IV Push once  folic acid 1 milliGRAM(s) Oral daily  glucagon  Injectable 1 milliGRAM(s) IntraMuscular once  insulin glargine Injectable (LANTUS) 50 Unit(s) SubCutaneous at bedtime  insulin lispro (ADMELOG) corrective regimen sliding scale   SubCutaneous three times a day before meals  isosorbide   mononitrate ER Tablet (IMDUR) 30 milliGRAM(s) Oral daily  mycophenolate mofetil 500 milliGRAM(s) Oral two times a day  NIFEdipine XL 60 milliGRAM(s) Oral every 12 hours  predniSONE   Tablet 5 milliGRAM(s) Oral daily  ranolazine 500 milliGRAM(s) Oral two times a day  sertraline 50 milliGRAM(s) Oral daily  tacrolimus 0.5 milliGRAM(s) Oral two times a day    MEDICATIONS  (PRN):  acetaminophen     Tablet .. 650 milliGRAM(s) Oral every 6 hours PRN Temp greater or equal to 38C (100.4F), Mild Pain (1 - 3)  aluminum hydroxide/magnesium hydroxide/simethicone Suspension 30 milliLiter(s) Oral every 4 hours PRN Dyspepsia  dextrose Oral Gel 15 Gram(s) Oral once PRN Blood Glucose LESS THAN 70 milliGRAM(s)/deciliter  melatonin 3 milliGRAM(s) Oral at bedtime PRN Insomnia  ondansetron Injectable 4 milliGRAM(s) IV Push every 8 hours PRN Nausea and/or Vomiting      CAPILLARY BLOOD GLUCOSE  POCT Blood Glucose.: 104 mg/dL (10 Paul 2025 11:22)  POCT Blood Glucose.: 123 mg/dL (10 Paul 2025 07:20)  POCT Blood Glucose.: 151 mg/dL (09 Jun 2025 21:18)  POCT Blood Glucose.: 137 mg/dL (09 Jun 2025 17:50)    I&O's Summary      PHYSICAL EXAM:  Vital Signs Last 24 Hrs  T(C): 36.6 (10 Paul 2025 12:16), Max: 36.6 (10 Paul 2025 04:54)  T(F): 97.9 (10 Paul 2025 04:54), Max: 97.9 (10 Paul 2025 04:54)  HR: 60 (10 Paul 2025 12:16) (56 - 67)  BP: 150/59 (10 Paul 2025 12:16) (150/59 - 188/86)  BP(mean): --  RR: 17 (10 Paul 2025 12:16) (17 - 18)  SpO2: 97% (10 Paul 2025 12:16) (97% - 99%)        GENERAL: No acute distress, well-developed  HEAD:  Atraumatic, Normocephalic  EYES: conjunctiva and sclera clear  NECK: Supple, no lymphadenopathy, no JVD  CHEST/LUNG: CTAB; No wheezes, rales, or rhonchi  HEART: Regular rate and rhythm; No murmurs, rubs, or gallops  ABDOMEN: Soft, non-tender, non-distended  EXTREMITIES:  Right foot covered in Kerlix amputated toes and DFU  noted,    No clubbing, cyanosis, or edema  NEUROLOGY: A&O x 3, no focal deficits  SKIN: No rashes or lesions    LABS:                        18.2   10.59 )-----------( 156      ( 10 Paul 2025 07:24 )             57.0     06-10    140  |  105  |  18  ----------------------------<  135[H]  4.8   |  25  |  1.2    Ca    9.5      10 Paul 2025 07:24  Mg     2.0     06-10    TPro  7.2  /  Alb  4.2  /  TBili  0.4  /  DBili  x   /  AST  23  /  ALT  26  /  AlkPhos  106  06-10    PT/INR - ( 09 Jun 2025 12:05 )   PT: 12.80 sec;   INR: 1.08 ratio         PTT - ( 09 Jun 2025 12:05 )  PTT:44.6 sec      Urinalysis Basic - ( 10 Paul 2025 07:24 )    Color: x / Appearance: x / SG: x / pH: x  Gluc: 135 mg/dL / Ketone: x  / Bili: x / Urobili: x   Blood: x / Protein: x / Nitrite: x   Leuk Esterase: x / RBC: x / WBC x   Sq Epi: x / Non Sq Epi: x / Bacteria: x

## 2025-06-10 NOTE — BRIEF OPERATIVE NOTE - OPERATION/FINDINGS
Amputation of fourth digit of right foot was performed, Specimen of amputated digit with bone specimen was sent to pathology. Clean margins obtained to the level of the fourth metatarsal head. No recurrent signs of infection present after amputation.

## 2025-06-10 NOTE — CHART NOTE - NSCHARTNOTEFT_GEN_A_CORE
PACU ANESTHESIA ADMISSION NOTE      Procedure: Amputation of right fourth toe      Post op diagnosis:  Osteomyelitis of fourth toe of right foot        ____  Intubated  TV:______       Rate: ______      FiO2: ______    __x__  Patent Airway    ___x_  Full return of protective reflexes    __x__  Full recovery from anesthesia / back to baseline     Vitals:   T: 97.1           R: 16              BP: 135/67              Sat: 96%                   P: 60      Mental Status:  __x__ Awake   ___x__ Alert   _____ Drowsy   _____ Sedated    Nausea/Vomiting:  __x__ NO  ______Yes,   See Post - Op Orders          Pain Scale (0-10):  _____    Treatment: ____ None    __x__ See Post - Op/PCA Orders    Post - Operative Fluids:   ____ Oral   __x__ See Post - Op Orders    Plan: Discharge:   ____Home       __x___Floor     _____Critical Care    _____  Other:_________________

## 2025-06-10 NOTE — CONSULT NOTE ADULT - ASSESSMENT
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    RECOMMENDATIONS  -S/p Amputation of fourth digit of right foot with reportedly clean margins at fourth metatarsal head. (6/10) Follow up with cultures.  -Follow up with blood cultures.  -Follow up with podiatry team for appropriate local care.  -Strict DM control.  -Likely to be discharged on PO Augmentin 875-125 BID+Doxy 100mg BID for 14 days from 6/10/2025 as long as clear margins are clarified.  -Off loading to prevent pressure sores and preventive measures to avoid aspiration.    If any questions, please send a message or call on "[employer] Teams  Please continue to update ID with any pertinent new laboratory or radiographic findings.    Maria Luisa Sandoval M.D  Infectious Diseases Attending/   [employer] at [facility]

## 2025-06-10 NOTE — CONSULT NOTE ADULT - SUBJECTIVE AND OBJECTIVE BOX
NEMO ALLEN  74y, Male  Allergies    74y  Intolerances    Male    LOS  1d    HPI  HPI:  Patient is a 75yo male w/ pmhx HTN, HLD, DMII, ESRD s/p L renal transplant, CAD s/p CABG, cardiac arrythmia s/p AICD, PAD s/p LLE PTA, s/p left and right foot 2nd toe amputation sent to the ED by his podiatrist Dr. Loera for DFU, plan for OR tomorrow w/ podiatry. Patient denies fever, chills, n/v, chest pain, sob, abdominal pain, changes in BMs, urinary sxs.         (09 Jun 2025 17:28)      INFECTIOUS DISEASE HISTORY:  Hospital course-  ID consulted for antimicrobial recommendations.     Prior hospital charts reviewed [Yes]  Primary team notes reviewed [Yes]  Other consultant notes reviewed [Yes]    REVIEW OF SYSTEMS:  CONSTITUTIONAL: No fever or chills  HEENT: No sore throat  RESPIRATORY: No cough, no shortness of breath  CARDIOVASCULAR: No chest pain or palpitations  GASTROINTESTINAL: No abdominal or epigastric pain  GENITOURINARY: No dysuria  NEUROLOGICAL: No headache/dizziness  MSK: No joint pain, erythema, or swelling; no back pain  SKIN: No itching, rashes  All other ROS negative except noted above    PAST MEDICAL & SURGICAL HISTORY:  History of renal transplant      Hypertension      HLD (hyperlipidemia)      HF (heart failure)      CAD (coronary artery disease)      History of cardiac defibrillator placement      H/O kidney transplant      S/P CABG (coronary artery bypass graft)          SOCIAL HISTORY:  - Born in _____, migrated [place] in 19XX  - Currently working as / Retired  - Lives with _____; no pets  - No recent travel  - Denies tobacco use, alcohol use, illicit drug use  - Currently sexually active, has one male/female sexual partner    FAMILY HISTORY:      Allergy: 1d    ANTIMICROBIALS:      ANTIMICROBIALS (past 90 days):  MEDICATIONS  (STANDING):  vancomycin  IVPB   300 mL/Hr IV Intermittent (06-10-25 @ 06:00)   300 mL/Hr IV Intermittent (06-09-25 @ 18:49)    vancomycin  IVPB.   250 mL/Hr IV Intermittent (06-09-25 @ 13:01)        OTHER MEDS:   MEDICATIONS  (STANDING):  acetaminophen     Tablet .. 975 every 8 hours  aluminum hydroxide/magnesium hydroxide/simethicone Suspension 30 every 4 hours PRN  aspirin enteric coated 81 daily  atorvastatin 20 at bedtime  dapagliflozin 10 daily  dextrose 50% Injectable 25 once  dextrose Oral Gel 15 once PRN  glucagon  Injectable 1 once  HYDROmorphone  Injectable 0.5 every 10 minutes PRN  HYDROmorphone  Injectable 1 every 10 minutes PRN  insulin glargine Injectable (LANTUS) 50 at bedtime  insulin lispro (ADMELOG) corrective regimen sliding scale  three times a day before meals  melatonin 3 at bedtime PRN  mycophenolate mofetil 500 two times a day  NIFEdipine XL 60 every 12 hours  ondansetron Injectable 4 once PRN  predniSONE   Tablet 5 daily  ranolazine 500 two times a day  sertraline 50 daily  tacrolimus 0.5 two times a day      VITALS:  Vital Signs Last 24 Hrs  T(F): 97.6 (06-10-25 @ 15:28), Max: 98.1 (06-09-25 @ 11:48)    Vital Signs Last 24 Hrs  HR: 60 (06-10-25 @ 15:28) (56 - 67)  BP: 127/77 (06-10-25 @ 15:28) (121/64 - 188/86)  RR: 17 (06-10-25 @ 15:28)  SpO2: 96% (06-10-25 @ 15:28) (95% - 99%)  Wt(kg): --    EXAM:  GENERAL: NAD, lying in bed  HEAD: No head lesions  NECK: Supple, nontender to palpation; no JVD  CHEST/LUNG: Clear to auscultation bilaterally  HEART: S1 S2  ABDOMEN: Soft, nontender, nondistended; normoactive bowel sounds  EXTREMITIES: No clubbing, cyanosis, or petal edema  NERVOUS SYSTEM: Alert and oriented to person, time, place and situation, speech clear. No focal deficits   MSK: No joint erythema, swelling or pain  SKIN: No rashes or lesions, no superficial thrombophlebitis    Labs:                        18.2   10.59 )-----------( 156      ( 10 Paul 2025 07:24 )             57.0     06-10    140  |  105  |  18  ----------------------------<  135[H]  4.8   |  25  |  1.2    Ca    9.5      10 Paul 2025 07:24  Mg     2.0     06-10    TPro  7.2  /  Alb  4.2  /  TBili  0.4  /  DBili  x   /  AST  23  /  ALT  26  /  AlkPhos  106  06-10      WBC Trend:  WBC Count: 10.59 (06-10-25 @ 07:24)  WBC Count: 12.15 (06-09-25 @ 12:05)          Creatine Trend:  Creatinine: 1.2 (06-10)  Creatinine: 1.0 (06-09)      Liver Biochemical Testing Trend:  Alanine Aminotransferase (ALT/SGPT): 26 (06-10)  Alanine Aminotransferase (ALT/SGPT): 26 (06-09)  Aspartate Aminotransferase (AST/SGOT): 23 (06-10-25 @ 07:24)  Aspartate Aminotransferase (AST/SGOT): 30 (06-09-25 @ 14:15)  Bilirubin Total: 0.4 (06-10)  Bilirubin Total: 0.4 (06-09)      Trend LDH          Urinalysis Basic - ( 10 Paul 2025 07:24 )    Color: x / Appearance: x / SG: x / pH: x  Gluc: 135 mg/dL / Ketone: x  / Bili: x / Urobili: x   Blood: x / Protein: x / Nitrite: x   Leuk Esterase: x / RBC: x / WBC x   Sq Epi: x / Non Sq Epi: x / Bacteria: x        MICROBIOLOGY:    Male                                      C-Reactive Protein: <3.0 (06-10)              Blood Gas Venous - Lactate: 1.6 (06-09 @ 17:29)        INFLAMMATORY MARKERS  Sedimentation Rate, Erythrocyte: 0 mm/hr (06-10-25 @ 07:24)      RADIOLOGY & ADDITIONAL TESTS:  I have personally reviewed the imagings.  CXR      CT      CARDIOLOGY TESTING  12 Lead ECG:   Ventricular Rate 60 BPM    Atrial Rate 60 BPM    P-R Interval 176 ms    QRS Duration 126 ms    Q-T Interval 470 ms    QTC Calculation(Bazett) 470 ms    P Axis -10 degrees    R Axis 170 degrees    T Axis 129 degrees    Diagnosis Line AV dual-paced rhythm  Biventricular pacemaker detected  Abnormal ECG    Confirmed by Abner Summers ([id number]) on 6/9/2025 1:43:18 PM (06-09-25 @ 12:25)             NEMO ALLEN  74y, Male  Allergies    No Known Allergies    Intolerances    LOS  1d    HPI  HPI:  Patient is a 75yo male w/ pmhx HTN, HLD, DMII, ESRD s/p L renal transplant, CAD s/p CABG, cardiac arrythmia s/p AICD, PAD s/p LLE PTA, s/p left and right foot 2nd toe amputation sent to the ED by his podiatrist Dr. Loera for DFU, plan for OR tomorrow w/ podiatry. Patient denies fever, chills, n/v, chest pain, sob, abdominal pain, changes in BMs, urinary sxs.         (09 Jun 2025 17:28)      INFECTIOUS DISEASE HISTORY:  ID consulted for antimicrobial recommendations.     Prior hospital charts reviewed [Yes]  Primary team notes reviewed [Yes]  Other consultant notes reviewed [Yes]    REVIEW OF SYSTEMS:  CONSTITUTIONAL: No fever or chills  HEENT: No sore throat  RESPIRATORY: No cough, no shortness of breath  CARDIOVASCULAR: No chest pain or palpitations  GASTROINTESTINAL: No abdominal or epigastric pain  GENITOURINARY: No dysuria  NEUROLOGICAL: No headache/dizziness  MSK: No joint pain, erythema, or swelling; no back pain  SKIN: No itching, rashes  All other ROS negative except noted above    PAST MEDICAL & SURGICAL HISTORY:  History of renal transplant      Hypertension      HLD (hyperlipidemia)      HF (heart failure)      CAD (coronary artery disease)      History of cardiac defibrillator placement      H/O kidney transplant      S/P CABG (coronary artery bypass graft)    SOCIAL HISTORY:  - No recent travel    FAMILY HISTORY: No pertinent PMH for first degree relatives.    ANTIMICROBIALS:      ANTIMICROBIALS (past 90 days):  MEDICATIONS  (STANDING):  vancomycin  IVPB   300 mL/Hr IV Intermittent (06-10-25 @ 06:00)   300 mL/Hr IV Intermittent (06-09-25 @ 18:49)    vancomycin  IVPB.   250 mL/Hr IV Intermittent (06-09-25 @ 13:01)        OTHER MEDS:   MEDICATIONS  (STANDING):  acetaminophen     Tablet .. 975 every 8 hours  aluminum hydroxide/magnesium hydroxide/simethicone Suspension 30 every 4 hours PRN  aspirin enteric coated 81 daily  atorvastatin 20 at bedtime  dapagliflozin 10 daily  dextrose 50% Injectable 25 once  dextrose Oral Gel 15 once PRN  glucagon  Injectable 1 once  HYDROmorphone  Injectable 0.5 every 10 minutes PRN  HYDROmorphone  Injectable 1 every 10 minutes PRN  insulin glargine Injectable (LANTUS) 50 at bedtime  insulin lispro (ADMELOG) corrective regimen sliding scale  three times a day before meals  melatonin 3 at bedtime PRN  mycophenolate mofetil 500 two times a day  NIFEdipine XL 60 every 12 hours  ondansetron Injectable 4 once PRN  predniSONE   Tablet 5 daily  ranolazine 500 two times a day  sertraline 50 daily  tacrolimus 0.5 two times a day      VITALS:  Vital Signs Last 24 Hrs  T(F): 97.6 (06-10-25 @ 15:28), Max: 98.1 (06-09-25 @ 11:48)    Vital Signs Last 24 Hrs  HR: 60 (06-10-25 @ 15:28) (56 - 67)  BP: 127/77 (06-10-25 @ 15:28) (121/64 - 188/86)  RR: 17 (06-10-25 @ 15:28)  SpO2: 96% (06-10-25 @ 15:28) (95% - 99%)  Wt(kg): --    EXAM:  GENERAL: NAD, Obese.  HEAD: No head lesions  NECK: Supple  CHEST/LUNG: Clear to auscultation bilaterally  HEART: S1 S2  ABDOMEN: Soft, nontender  EXTREMITIES: No clubbing. Right toe ulcer noted.  NERVOUS SYSTEM: Alert and oriented to person.  MSK: No joint erythema, swelling or pain  SKIN: No rashes or lesions, no superficial thrombophlebitis    Labs:                        18.2   10.59 )-----------( 156      ( 10 Paul 2025 07:24 )             57.0     06-10    140  |  105  |  18  ----------------------------<  135[H]  4.8   |  25  |  1.2    Ca    9.5      10 Paul 2025 07:24  Mg     2.0     06-10    TPro  7.2  /  Alb  4.2  /  TBili  0.4  /  DBili  x   /  AST  23  /  ALT  26  /  AlkPhos  106  06-10      WBC Trend:  WBC Count: 10.59 (06-10-25 @ 07:24)  WBC Count: 12.15 (06-09-25 @ 12:05)          Creatine Trend:  Creatinine: 1.2 (06-10)  Creatinine: 1.0 (06-09)      Liver Biochemical Testing Trend:  Alanine Aminotransferase (ALT/SGPT): 26 (06-10)  Alanine Aminotransferase (ALT/SGPT): 26 (06-09)  Aspartate Aminotransferase (AST/SGOT): 23 (06-10-25 @ 07:24)  Aspartate Aminotransferase (AST/SGOT): 30 (06-09-25 @ 14:15)  Bilirubin Total: 0.4 (06-10)  Bilirubin Total: 0.4 (06-09)      Trend LDH          Urinalysis Basic - ( 10 Paul 2025 07:24 )    Color: x / Appearance: x / SG: x / pH: x  Gluc: 135 mg/dL / Ketone: x  / Bili: x / Urobili: x   Blood: x / Protein: x / Nitrite: x   Leuk Esterase: x / RBC: x / WBC x   Sq Epi: x / Non Sq Epi: x / Bacteria: x        MICROBIOLOGY:    Male      C-Reactive Protein: <3.0 (06-10)  Blood Gas Venous - Lactate: 1.6 (06-09 @ 17:29)        INFLAMMATORY MARKERS  Sedimentation Rate, Erythrocyte: 0 mm/hr (06-10-25 @ 07:24)      RADIOLOGY & ADDITIONAL TESTS:  I have personally reviewed the imagings.  CXR      CT  < from: VA Duplex Lower Extrem Arterial, Bilat (06.09.25 @ 21:03) >  INTERPRETATION:  This patient is a 84-year-old male with lower extremity   gangrene. Lower extremity arterial duplex ultrasound was performed to   assess for arterial occlusive disease.    Bilateral common femoral, deep femoral, superficial femoral, popliteal,   anterior tibial, posterior tibial and peroneal arteries were visualized.    There is no evidence of significant arterial occlusive disease or   arterial occlusions in the bilateral lower extremities.    Impression:    Normal arterial flow in the bilateral lower extremities.    --- End of Report ---    < end of copied text >  < from: Xray Foot AP + Lateral, Right (06.09.25 @ 12:36) >  INTERPRETATION:  Clinical information: Foot infection.    3 views of the right foot are provided for review. No prior studies are   available for comparison.    Findings/  impression:    There appears to be an ulcer adjacent to the first digit measuring 9 mm   as well as adjacent to the first tarsometatarsal joint measuring 1.3 cm.   Additional ulcers may be present. CT scan may be of benefit.    No definite evidence of erosive or destructive bony changes to suggest   osteomyelitis.    Soft tissue swelling and vascular calcifications.    Amputation of the first digit to the proximal phalanx and amputation of   the third digit to the metatarsophalangeal joint..    Degenerative and chronic change.    < end of copied text >      CARDIOLOGY TESTING  12 Lead ECG:   Ventricular Rate 60 BPM    Atrial Rate 60 BPM    P-R Interval 176 ms    QRS Duration 126 ms    Q-T Interval 470 ms    QTC Calculation(Bazett) 470 ms    P Axis -10 degrees    R Axis 170 degrees    T Axis 129 degrees    Diagnosis Line AV dual-paced rhythm  Biventricular pacemaker detected  Abnormal ECG    Confirmed by Abner Summers ([id number]) on 6/9/2025 1:43:18 PM (06-09-25 @ 12:25)

## 2025-06-10 NOTE — PROGRESS NOTE ADULT - ASSESSMENT
Patient is a 73yo male w/ pmhx HTN, HLD, DMII, ESRD s/p L renal transplant, CAD s/p CABG, cardiac arrythmia s/p AICD, PAD s/p LLE PTA, s/p left and right foot 2nd toe amputation sent to the ED by his podiatrist Dr. Loera for DFU, plan for OR tomorrow w/ podiatry.     Type II DM with Diabetic Foot ulcer  CAD s/p CABG + AICD + HLD + HTN   PAD s/p LLE PTA, s/p left and right foot 2nd toe amputation    Xray Foot AP + Lateral, Right: There appears to be an ulcer adjacent to the first digit measuring 9 mm   as well as adjacent to the first tarsometatarsal joint measuring 1.3 cm. Additional ulcers may be present. CT scan may be of benefit.  No definite evidence of erosive or destructive bony changes to suggest osteomyelitis.  Amputation of the first digit to the proximal phalanx and amputation of the third digit to the metatarsophalangeal joint..  -Podiatry consult appreciated>>OR today ***Resume Eliquis per Pod****  - Spoke with ID >>Continue to Hold ABX, f/up official consult   - c/w  ASA 81mg QD, Imdur 30mg QD, Ranexa 500mg BID  - c/w home Lantus 50u QHS w/ ISS, keep -180   - c/w Lipitor 20mg QD  Nifedipine 60mg BID and DASH with CHO       H/O ESRD now s/p L renal transplant  - renal function at  baseline   - resume home prednisone 5mg QD, prograf 0.5mg BID, Cellcept 500mg BID  - Monitor RF, avoid nephrotoxic agents     Depression:  c/w zoloft 50mg QD     VTE PPX: SCDs>>resume Eliquis 24hrs after procedure   GI PPX: Feeding   Full code   Dispo: from Home   Pending outcome of Surgery: Amputation v.s I&D, pain control and final abx regimen   Anticipate 48-72hrs

## 2025-06-11 ENCOUNTER — TRANSCRIPTION ENCOUNTER (OUTPATIENT)
Age: 75
End: 2025-06-11

## 2025-06-11 VITALS
HEART RATE: 68 BPM | OXYGEN SATURATION: 96 % | TEMPERATURE: 98 F | RESPIRATION RATE: 18 BRPM | SYSTOLIC BLOOD PRESSURE: 128 MMHG | DIASTOLIC BLOOD PRESSURE: 71 MMHG

## 2025-06-11 LAB
A1C WITH ESTIMATED AVERAGE GLUCOSE RESULT: 6.5 % — HIGH (ref 4–5.6)
ESTIMATED AVERAGE GLUCOSE: 140 MG/DL — HIGH (ref 68–114)
GLUCOSE BLDC GLUCOMTR-MCNC: 103 MG/DL — HIGH (ref 70–99)
GLUCOSE BLDC GLUCOMTR-MCNC: 177 MG/DL — HIGH (ref 70–99)

## 2025-06-11 PROCEDURE — 99239 HOSP IP/OBS DSCHRG MGMT >30: CPT

## 2025-06-11 RX ORDER — LIDOCAINE HYDROCHLORIDE 20 MG/ML
1 JELLY TOPICAL
Qty: 10 | Refills: 0
Start: 2025-06-11 | End: 2025-06-20

## 2025-06-11 RX ORDER — AMOXICILLIN AND CLAVULANATE POTASSIUM 500; 125 MG/1; MG/1
1 TABLET, FILM COATED ORAL
Qty: 29 | Refills: 0
Start: 2025-06-11 | End: 2025-06-24

## 2025-06-11 RX ORDER — DOXYCYCLINE HYCLATE 100 MG
2 TABLET ORAL
Qty: 57 | Refills: 0
Start: 2025-06-11 | End: 2025-06-24

## 2025-06-11 RX ADMIN — Medication 81 MILLIGRAM(S): at 11:28

## 2025-06-11 RX ADMIN — LIDOCAINE HYDROCHLORIDE 1 PATCH: 20 JELLY TOPICAL at 07:00

## 2025-06-11 RX ADMIN — Medication 30 MILLILITER(S): at 06:58

## 2025-06-11 RX ADMIN — LIDOCAINE HYDROCHLORIDE 1 PATCH: 20 JELLY TOPICAL at 05:20

## 2025-06-11 RX ADMIN — FOLIC ACID 1 MILLIGRAM(S): 1 TABLET ORAL at 11:28

## 2025-06-11 RX ADMIN — MYCOPHENOLATE MOFETIL 500 MILLIGRAM(S): 500 TABLET, FILM COATED ORAL at 05:20

## 2025-06-11 RX ADMIN — Medication 100 MILLIGRAM(S): at 05:20

## 2025-06-11 RX ADMIN — Medication 975 MILLIGRAM(S): at 07:00

## 2025-06-11 RX ADMIN — SERTRALINE 50 MILLIGRAM(S): 100 TABLET, FILM COATED ORAL at 11:28

## 2025-06-11 RX ADMIN — DAPAGLIFLOZIN 10 MILLIGRAM(S): 5 TABLET, FILM COATED ORAL at 11:28

## 2025-06-11 RX ADMIN — RANOLAZINE 500 MILLIGRAM(S): 1000 TABLET, FILM COATED, EXTENDED RELEASE ORAL at 05:19

## 2025-06-11 RX ADMIN — Medication 40 MILLIGRAM(S): at 11:28

## 2025-06-11 RX ADMIN — PREDNISONE 5 MILLIGRAM(S): 20 TABLET ORAL at 05:19

## 2025-06-11 RX ADMIN — TACROLIMUS 0.5 MILLIGRAM(S): 0.5 CAPSULE ORAL at 11:28

## 2025-06-11 RX ADMIN — INSULIN LISPRO 2: 100 INJECTION, SOLUTION INTRAVENOUS; SUBCUTANEOUS at 11:53

## 2025-06-11 RX ADMIN — AMOXICILLIN AND CLAVULANATE POTASSIUM 1 TABLET(S): 500; 125 TABLET, FILM COATED ORAL at 05:19

## 2025-06-11 RX ADMIN — Medication 975 MILLIGRAM(S): at 05:20

## 2025-06-11 RX ADMIN — Medication 60 MILLIGRAM(S): at 05:20

## 2025-06-11 NOTE — DISCHARGE NOTE PROVIDER - NSDCCPCAREPLAN_GEN_ALL_CORE_FT
PRINCIPAL DISCHARGE DIAGNOSIS  Diagnosis: Osteomyelitis of fourth toe of right foot  Assessment and Plan of Treatment: Xray Foot AP + Lateral, Right:   There appears to be an ulcer adjacent to the first digit measuring 9 mm   as well as adjacent to the first tarsometatarsal joint measuring 1.3 cm. Additional ulcers may be present. CT scan may be of benefit.  No definite evidence of erosive or destructive bony changes to suggest osteomyelitis.  You are now Status post amputation of the 4th digit of the right foot  with altagracia matrgins obtained  you were evaluated by our infectious disease team  Complete your antibiotics as precribed   I recomend taking a probiotic to replenish your gut loni   follow-up with the podiatrist tomorrow and pcp in 1 week         SECONDARY DISCHARGE DIAGNOSES  Diagnosis: Heart burn  Assessment and Plan of Treatment: Possibly from the antibitics, you are beibng discharged with a protonix to help wuith stomach acid  conrtinue to take 2 weeks after antibiotics then stop    Diagnosis: Right knee pain  Assessment and Plan of Treatment: Post traumatic from outpatient trauma   continue with tylenol and lidocaine patches

## 2025-06-11 NOTE — DISCHARGE NOTE PROVIDER - NSDCMRMEDTOKEN_GEN_ALL_CORE_FT
amoxicillin-clavulanate 875 mg-125 mg oral tablet: 1 tab(s) orally every 12 hours TAKE 1 PILL TONIGHT TO COMPLETE TONIGHTS DOSE  aspirin 81 mg oral delayed release tablet: 1 tab(s) orally once a day  atorvastatin 20 mg oral tablet: 1 tab(s) orally once a day (at bedtime)  CellCept 500 mg oral tablet: 1 tab(s) orally 2 times a day  doxycycline monohydrate 50 mg oral capsule: 2 cap(s) orally every 12 hours TAKE 1 PILL TONIGHT TO COMPLETE TONIGHTS DOSE  ELIQUIS 5 MG TABLET: orally 2 times a day  Farxiga 10 mg oral tablet: 1 tab(s) orally once a day  Fiasp 100 units/mL injectable solution: injectable  folic acid 1 mg oral tablet: 1 tab(s) orally once a day  Imdur 30 mg oral tablet, extended release: 1 tab(s) orally once a day (in the morning)  LANTUS SOLOSTAR 100 UNIT/ML: 50 unit(s) subcutaneous once a day (at bedtime)  lidocaine 4% topical film: Apply topically to affected area once a day  Mounjaro 5 mg/0.5 mL subcutaneous solution: 5 milligram(s) subcutaneously once a week  NIFEdipine (Eqv-Adalat CC) 60 mg oral tablet, extended release: 1 tab(s) orally every 12 hours  predniSONE 5 mg oral tablet: 1 tab(s) orally once a day  Prograf 0.5 mg oral capsule: 1 cap(s) orally 2 times a day  Protonix 40 mg oral delayed release tablet: 1 tab(s) orally once a day STOP 2 WEEKS AFTER ANTIBIOTICS  RANOLAZINE  MG TABLET: orally 2 times a day  SERTRALINE 50MG TABLETS: orally once a day

## 2025-06-11 NOTE — DISCHARGE NOTE NURSING/CASE MANAGEMENT/SOCIAL WORK - NSDCPEFALRISK_GEN_ALL_CORE
For information on Fall & Injury Prevention, visit: [email or web address] OR  [email or web address] OR  https://www.cdc.gov/steadi/patient.html

## 2025-06-11 NOTE — DISCHARGE NOTE PROVIDER - HOSPITAL COURSE
Patient is a 75yo male w/ pmhx HTN, HLD, DMII, ESRD s/p L renal transplant, CAD s/p CABG, cardiac arrythmia s/p AICD, PAD s/p LLE PTA, s/p left and right foot 2nd toe amputation sent to the ED by his podiatrist Dr. Loera for DFU, plan for OR tomorrow w/ podiatry.     Type II DM with Diabetic Foot ulcer  CAD s/p CABG + AICD + HLD + HTN   PAD s/p LLE PTA, s/p left and right foot 2nd toe amputation    Xray Foot AP + Lateral, Right: There appears to be an ulcer adjacent to the first digit measuring 9 mm   as well as adjacent to the first tarsometatarsal joint measuring 1.3 cm. Additional ulcers may be present. CT scan may be of benefit.  No definite evidence of erosive or destructive bony changes to suggest osteomyelitis.  Amputation of the first digit to the proximal phalanx and amputation of the third digit to the metatarsophalangeal joint..  -Podiatry consult appreciated>>OR today ***Resume Eliquis per Pod****  - Spoke with ID >>Continue to Hold ABX, f/up official consult   - c/w  ASA 81mg QD, Imdur 30mg QD, Ranexa 500mg BID  - c/w home Lantus 50u QHS w/ ISS, keep -180   - c/w Lipitor 20mg QD  Nifedipine 60mg BID and DASH with CHO

## 2025-06-11 NOTE — DISCHARGE NOTE PROVIDER - CARE PROVIDERS DIRECT ADDRESSES
[email or web address]

## 2025-06-11 NOTE — DISCHARGE NOTE NURSING/CASE MANAGEMENT/SOCIAL WORK - PATIENT PORTAL LINK FT
You can access the FollowMyHealth Patient Portal offered by [facility] by registering at the following website: [email or web address]. By joining [facility]’s FollowMyHealth portal, you will also be able to view your health information using other applications (apps) compatible with our system.

## 2025-06-11 NOTE — DISCHARGE NOTE PROVIDER - CARE PROVIDER_API CALL
GAVIOTA IBRAHIM  [address] [place] [address]  Phone: ([phone]  Fax: ()-  Follow Up Time:     Kika Dumont  Podiatric Medicine and Surgery  [address] [place] [id number]  Phone: [phone]  Fax: [phone]  Follow Up Time:

## 2025-06-11 NOTE — DISCHARGE NOTE NURSING/CASE MANAGEMENT/SOCIAL WORK - FINANCIAL ASSISTANCE
[facility] provides services at a reduced cost to those who are determined to be eligible through [facility]’s financial assistance program. Information regarding [facility]’s financial assistance program can be found by going to [email or web address] or by calling [phone].

## 2025-06-11 NOTE — PROGRESS NOTE ADULT - SUBJECTIVE AND OBJECTIVE BOX
Podiatry Progress Note    Subjective:   NEMO ALLEN is a pleasant well-nourished, well developed 74y Male in no acute distress, alert awake, and oriented to person, place and time.  Patient is a 74y old  Male who presents with a chief complaint of Osteomyelitis  (11 Jun 2025 10:42)      PAST MEDICAL & SURGICAL HISTORY:  History of renal transplant      Hypertension      HLD (hyperlipidemia)      HF (heart failure)      CAD (coronary artery disease)      History of cardiac defibrillator placement      H/O kidney transplant      S/P CABG (coronary artery bypass graft)           Objective:  Vital Signs Last 24 Hrs  T(C): 36.7 (11 Jun 2025 03:30), Max: 36.8 (10 Paul 2025 23:31)  T(F): 98.1 (11 Jun 2025 03:30), Max: 98.2 (10 Paul 2025 23:31)  HR: 69 (11 Jun 2025 03:30) (60 - 79)  BP: 134/72 (11 Jun 2025 03:30) (121/64 - 150/59)  BP(mean): --  RR: 18 (11 Jun 2025 03:30) (15 - 18)  SpO2: 97% (11 Jun 2025 03:30) (94% - 98%)    Parameters below as of 11 Jun 2025 03:30  Patient On (Oxygen Delivery Method): room air                            18.2   10.59 )-----------( 156      ( 10 Paul 2025 07:24 )             57.0                 06-10    140  |  105  |  18  ----------------------------<  135[H]  4.8   |  25  |  1.2    Ca    9.5      10 Paul 2025 07:24  Mg     2.0     06-10    TPro  7.2  /  Alb  4.2  /  TBili  0.4  /  DBili  x   /  AST  23  /  ALT  26  /  AlkPhos  106  06-10       Podiatry Progress Note    Subjective:   NEMO ALLEN is a pleasant well-nourished, well developed 74y Male in no acute distress, alert awake, and oriented to person, place and time.  Patient is a 74y old  Male who presents with a chief complaint of Osteomyelitis  (11 Jun 2025 10:42). Pt seen & evaluated at bedside. Pt reports mild bleeding through dressings after surgery and that nursing redressed foot.  No strikethrough noted at bedside. Pt denies N/V/F/C/foot pain.  No other pedal complaints.       PAST MEDICAL & SURGICAL HISTORY:  History of renal transplant      Hypertension      HLD (hyperlipidemia)      HF (heart failure)      CAD (coronary artery disease)      History of cardiac defibrillator placement      H/O kidney transplant      S/P CABG (coronary artery bypass graft)           Objective:  Vital Signs Last 24 Hrs  T(C): 36.7 (11 Jun 2025 03:30), Max: 36.8 (10 Paul 2025 23:31)  T(F): 98.1 (11 Jun 2025 03:30), Max: 98.2 (10 Paul 2025 23:31)  HR: 69 (11 Jun 2025 03:30) (60 - 79)  BP: 134/72 (11 Jun 2025 03:30) (121/64 - 150/59)  BP(mean): --  RR: 18 (11 Jun 2025 03:30) (15 - 18)  SpO2: 97% (11 Jun 2025 03:30) (94% - 98%)    Parameters below as of 11 Jun 2025 03:30  Patient On (Oxygen Delivery Method): room air                            18.2   10.59 )-----------( 156      ( 10 Paul 2025 07:24 )             57.0                 06-10    140  |  105  |  18  ----------------------------<  135[H]  4.8   |  25  |  1.2    Ca    9.5      10 Paul 2025 07:24  Mg     2.0     06-10    TPro  7.2  /  Alb  4.2  /  TBili  0.4  /  DBili  x   /  AST  23  /  ALT  26  /  AlkPhos  106  06-10    -----------  ACC: [id number] EXAM: XR FOOT 2 VIEWS RT ORDERED BY: ELEANOR OBRIEN  PROCEDURE DATE: 06/09/2025  INTERPRETATION: Clinical information: Foot infection.  3 views of the right foot are provided for review. No prior studies are available for comparison.    Findings/impression:  There appears to be an ulcer adjacent to the first digit measuring 9 mm as well as adjacent to the first tarsometatarsal joint measuring 1.3 cm. Additional ulcers may be present. CT scan may be of benefit.  No definite evidence of erosive or destructive bony changes to suggest osteomyelitis.  Soft tissue swelling and vascular calcifications.  Amputation of the first digit to the proximal phalanx and amputation of the third digit to the metatarsophalangeal joint..  Degenerative and chronic change.  --- End of Report ---    JEZ CARVAJAL MD; Resident Radiologist  This document has been electronically signed.  STELLA HERRERA MD; Attending Radiologist  This document has been electronically signed. Jun 9 2025 6:12PM  ------------      Physical Exam - Lower Extremity Focused:   Derm:   Open Right 4th toe amputation site wound   -Wound base red, granular. No malodor   -Nylon sutures to proximal wound margin intact   -Periwound: No erythema/edema. Cellulitis resolved.   Wound Probes to subcut w/ no active drainage/bleeding.  Foot is warm, capillary refill is instant to the toes     Vascular: DP and PT Pulses Diminished; Foot is Warm to Warm to the touch   Neuro: Protective Sensation Diminished / Moderately Neuropathic   MSK: Partial hallux amputation. 3rd & 4th toe amputation. Hammertoes 2, 5.     Assessment:   Right 4th toe ulcer r/o Osteomyelitis  Cellulitic Right Lower Extremity   s/p Right 4th toe amputation 6/10/25    Plan:  Chart reviewed and Patient evaluated. All Questions and Concerns Addressed and Answered  Discussed diagnosis and treatment with patient  Wound Flushed w/ NS Wound dressed w/ Xeroform / DSD / Kerlix / ACE  Local Wound Care; As Stated Above   F/u OR cx/path  XR Imaging Right Foot 6/9; See report above.   POXR Imaging Right Foot 6/10; Performed; f/u report.  Lower Extremity Arterial Duplex B/L 6/9/25: Normal arterial flow in the bilateral lower extremities.  ESR 0 / CRP < 3  6/10/25  ID Consult appreciated; f/u final OR cx/path. Abx recs appreciated.   Please dispense surgical shoe Right foot prior to DSC  Patient stable from Podiatry standpoint  Patient can f/u w/Dr. Dumont at [phone] [facility] 1 week post DSC    Please reach out on Teams for further communication

## 2025-06-11 NOTE — DISCHARGE NOTE PROVIDER - ATTENDING DISCHARGE PHYSICAL EXAMINATION:
GENERAL: No acute distress, well-developed  HEAD:  Atraumatic, Normocephalic  EYES: conjunctiva and sclera clear  NECK: Supple, no lymphadenopathy, no JVD  CHEST/LUNG: CTAB; No wheezes, rales, or rhonchi  HEART: Regular rate and rhythm; No murmurs, rubs, or gallops  ABDOMEN: Soft, non-tender, non-distended  EXTREMITIES:  Right foot covered in Kerlix amputated toes and DFU  noted,    No clubbing, cyanosis, or edema  NEUROLOGY: A&O x 3, no focal deficits  SKIN: No rashes or lesions

## 2025-06-11 NOTE — DISCHARGE NOTE PROVIDER - NSDCDCMDCOMP_GEN_ALL_CORE
1) [facility]  [address] [place] [id number]    [facility] : [phone]  [facility]: [phone]  24/7 Crisis Line: [phone]    2) Please call [phone] to schedule appointment for Pulmonology Consultation.   This document is complete and the patient is ready for discharge.

## 2025-06-12 LAB
GRAM STN FLD: ABNORMAL
SPECIMEN SOURCE: SIGNIFICANT CHANGE UP

## 2025-06-13 LAB
-  AMOXICILLIN/CLAVULANIC ACID: SIGNIFICANT CHANGE UP
-  AMPICILLIN/SULBACTAM: SIGNIFICANT CHANGE UP
-  AMPICILLIN: SIGNIFICANT CHANGE UP
-  AZTREONAM: SIGNIFICANT CHANGE UP
-  CEFAZOLIN: SIGNIFICANT CHANGE UP
-  CEFEPIME: SIGNIFICANT CHANGE UP
-  CEFOXITIN: SIGNIFICANT CHANGE UP
-  CEFTAZIDIME/AVIBACTAM: SIGNIFICANT CHANGE UP
-  CEFTOLOZANE/TAZOBACTAM: SIGNIFICANT CHANGE UP
-  CEFTRIAXONE: SIGNIFICANT CHANGE UP
-  CIPROFLOXACIN: SIGNIFICANT CHANGE UP
-  ERTAPENEM: SIGNIFICANT CHANGE UP
-  GENTAMICIN: SIGNIFICANT CHANGE UP
-  IMIPENEM: SIGNIFICANT CHANGE UP
-  LEVOFLOXACIN: SIGNIFICANT CHANGE UP
-  MEROPENEM/VABORBACTAM: SIGNIFICANT CHANGE UP
-  MEROPENEM: SIGNIFICANT CHANGE UP
-  PIPERACILLIN/TAZOBACTAM: SIGNIFICANT CHANGE UP
-  TIGECYCLINE: SIGNIFICANT CHANGE UP
-  TOBRAMYCIN: SIGNIFICANT CHANGE UP
-  TRIMETHOPRIM/SULFAMETHOXAZOLE: SIGNIFICANT CHANGE UP
BLANDM BLD POS QL PROBE: SIGNIFICANT CHANGE UP
METHOD TYPE: SIGNIFICANT CHANGE UP
METHOD TYPE: SIGNIFICANT CHANGE UP

## 2025-06-13 NOTE — CHART NOTE - NSCHARTNOTEFT_GEN_A_CORE
called in with wound culture results.  patient was discharged on augmentin, and doxycycline, will notify ID in the am

## 2025-06-14 LAB
-  AMIKACIN: SIGNIFICANT CHANGE UP
-  AZTREONAM: SIGNIFICANT CHANGE UP
-  CEFEPIME: SIGNIFICANT CHANGE UP
-  CEFTRIAXONE: SIGNIFICANT CHANGE UP
-  CIPROFLOXACIN: SIGNIFICANT CHANGE UP
-  GENTAMICIN: SIGNIFICANT CHANGE UP
-  LEVOFLOXACIN: SIGNIFICANT CHANGE UP
-  MEROPENEM: SIGNIFICANT CHANGE UP
-  PIPERACILLIN/TAZOBACTAM: SIGNIFICANT CHANGE UP
-  TOBRAMYCIN: SIGNIFICANT CHANGE UP
-  TRIMETHOPRIM/SULFAMETHOXAZOLE: SIGNIFICANT CHANGE UP
CULTURE RESULTS: SIGNIFICANT CHANGE UP
CULTURE RESULTS: SIGNIFICANT CHANGE UP
METHOD TYPE: SIGNIFICANT CHANGE UP
SPECIMEN SOURCE: SIGNIFICANT CHANGE UP
SPECIMEN SOURCE: SIGNIFICANT CHANGE UP

## 2025-06-17 DIAGNOSIS — G89.11 ACUTE PAIN DUE TO TRAUMA: ICD-10-CM

## 2025-06-17 DIAGNOSIS — F32.A DEPRESSION, UNSPECIFIED: ICD-10-CM

## 2025-06-17 DIAGNOSIS — Z95.1 PRESENCE OF AORTOCORONARY BYPASS GRAFT: ICD-10-CM

## 2025-06-17 DIAGNOSIS — L03.115 CELLULITIS OF RIGHT LOWER LIMB: ICD-10-CM

## 2025-06-17 DIAGNOSIS — Z94.0 KIDNEY TRANSPLANT STATUS: ICD-10-CM

## 2025-06-17 DIAGNOSIS — M25.561 PAIN IN RIGHT KNEE: ICD-10-CM

## 2025-06-17 DIAGNOSIS — Z89.422 ACQUIRED ABSENCE OF OTHER LEFT TOE(S): ICD-10-CM

## 2025-06-17 DIAGNOSIS — I50.9 HEART FAILURE, UNSPECIFIED: ICD-10-CM

## 2025-06-17 DIAGNOSIS — D84.9 IMMUNODEFICIENCY, UNSPECIFIED: ICD-10-CM

## 2025-06-17 DIAGNOSIS — R12 HEARTBURN: ICD-10-CM

## 2025-06-17 DIAGNOSIS — E78.5 HYPERLIPIDEMIA, UNSPECIFIED: ICD-10-CM
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
## 2025-06-17 DIAGNOSIS — Z79.01 LONG TERM (CURRENT) USE OF ANTICOAGULANTS: ICD-10-CM

## 2025-06-17 DIAGNOSIS — Z79.52 LONG TERM (CURRENT) USE OF SYSTEMIC STEROIDS: ICD-10-CM

## 2025-06-17 DIAGNOSIS — E11.621 TYPE 2 DIABETES MELLITUS WITH FOOT ULCER: ICD-10-CM

## 2025-06-17 DIAGNOSIS — Z89.421 ACQUIRED ABSENCE OF OTHER RIGHT TOE(S): ICD-10-CM

## 2025-06-17 DIAGNOSIS — I25.10 ATHEROSCLEROTIC HEART DISEASE OF NATIVE CORONARY ARTERY WITHOUT ANGINA PECTORIS: ICD-10-CM

## 2025-06-17 DIAGNOSIS — E87.5 HYPERKALEMIA: ICD-10-CM

## 2025-06-17 DIAGNOSIS — Z87.891 PERSONAL HISTORY OF NICOTINE DEPENDENCE: ICD-10-CM

## 2025-06-17 DIAGNOSIS — E66.9 OBESITY, UNSPECIFIED: ICD-10-CM

## 2025-06-17 DIAGNOSIS — M86.9 OSTEOMYELITIS, UNSPECIFIED: ICD-10-CM

## 2025-06-17 DIAGNOSIS — Z79.4 LONG TERM (CURRENT) USE OF INSULIN: ICD-10-CM

## 2025-06-17 DIAGNOSIS — I11.0 HYPERTENSIVE HEART DISEASE WITH HEART FAILURE: ICD-10-CM

## 2025-06-17 DIAGNOSIS — E11.69 TYPE 2 DIABETES MELLITUS WITH OTHER SPECIFIED COMPLICATION: ICD-10-CM

## 2025-06-17 DIAGNOSIS — Z79.82 LONG TERM (CURRENT) USE OF ASPIRIN: ICD-10-CM

## 2025-06-17 DIAGNOSIS — L97.529 NON-PRESSURE CHRONIC ULCER OF OTHER PART OF LEFT FOOT WITH UNSPECIFIED SEVERITY: ICD-10-CM

## 2025-06-17 DIAGNOSIS — Z95.810 PRESENCE OF AUTOMATIC (IMPLANTABLE) CARDIAC DEFIBRILLATOR: ICD-10-CM

## 2025-06-17 LAB
CULTURE RESULTS: ABNORMAL
ORGANISM # SPEC MICROSCOPIC CNT: ABNORMAL
ORGANISM # SPEC MICROSCOPIC CNT: SIGNIFICANT CHANGE UP
SPECIMEN SOURCE: SIGNIFICANT CHANGE UP
SURGICAL PATHOLOGY STUDY: SIGNIFICANT CHANGE UP

## 2025-07-10 ENCOUNTER — INPATIENT (INPATIENT)
Facility: HOSPITAL | Age: 75
LOS: 1 days | Discharge: HOME | DRG: 863 | End: 2025-07-12
Attending: STUDENT IN AN ORGANIZED HEALTH CARE EDUCATION/TRAINING PROGRAM | Admitting: STUDENT IN AN ORGANIZED HEALTH CARE EDUCATION/TRAINING PROGRAM
Payer: MEDICARE

## 2025-07-10 VITALS
SYSTOLIC BLOOD PRESSURE: 140 MMHG | HEART RATE: 49 BPM | RESPIRATION RATE: 18 BRPM | HEIGHT: 68 IN | OXYGEN SATURATION: 96 % | DIASTOLIC BLOOD PRESSURE: 72 MMHG | TEMPERATURE: 98 F | WEIGHT: 201.94 LBS

## 2025-07-10 DIAGNOSIS — Z95.810 PRESENCE OF AUTOMATIC (IMPLANTABLE) CARDIAC DEFIBRILLATOR: Chronic | ICD-10-CM

## 2025-07-10 DIAGNOSIS — Z95.1 PRESENCE OF AORTOCORONARY BYPASS GRAFT: Chronic | ICD-10-CM

## 2025-07-10 DIAGNOSIS — Z94.0 KIDNEY TRANSPLANT STATUS: Chronic | ICD-10-CM

## 2025-07-10 DIAGNOSIS — T81.49XA INFECTION FOLLOWING A PROCEDURE, OTHER SURGICAL SITE, INITIAL ENCOUNTER: ICD-10-CM

## 2025-07-10 LAB
ALBUMIN SERPL ELPH-MCNC: 4.1 G/DL — SIGNIFICANT CHANGE UP (ref 3.5–5.2)
ALP SERPL-CCNC: 145 U/L — HIGH (ref 30–115)
ALT FLD-CCNC: 38 U/L — SIGNIFICANT CHANGE UP (ref 0–41)
ANION GAP SERPL CALC-SCNC: 12 MMOL/L — SIGNIFICANT CHANGE UP (ref 7–14)
AST SERPL-CCNC: 30 U/L — SIGNIFICANT CHANGE UP (ref 0–41)
BASOPHILS # BLD AUTO: 0.03 K/UL — SIGNIFICANT CHANGE UP (ref 0–0.2)
BASOPHILS NFR BLD AUTO: 0.3 % — SIGNIFICANT CHANGE UP (ref 0–2)
BILIRUB SERPL-MCNC: 0.3 MG/DL — SIGNIFICANT CHANGE UP (ref 0.2–1.2)
BUN SERPL-MCNC: 23 MG/DL — HIGH (ref 10–20)
CALCIUM SERPL-MCNC: 9.1 MG/DL — SIGNIFICANT CHANGE UP (ref 8.4–10.5)
CHLORIDE SERPL-SCNC: 103 MMOL/L — SIGNIFICANT CHANGE UP (ref 98–110)
CO2 SERPL-SCNC: 22 MMOL/L — SIGNIFICANT CHANGE UP (ref 17–32)
CREAT SERPL-MCNC: 0.9 MG/DL — SIGNIFICANT CHANGE UP (ref 0.7–1.5)
EGFR: 90 ML/MIN/1.73M2 — SIGNIFICANT CHANGE UP
EGFR: 90 ML/MIN/1.73M2 — SIGNIFICANT CHANGE UP
EOSINOPHIL # BLD AUTO: 0.05 K/UL — SIGNIFICANT CHANGE UP (ref 0–0.5)
EOSINOPHIL NFR BLD AUTO: 0.4 % — SIGNIFICANT CHANGE UP (ref 0–6)
GLUCOSE SERPL-MCNC: 169 MG/DL — HIGH (ref 70–99)
HCT VFR BLD CALC: 47 % — SIGNIFICANT CHANGE UP (ref 39–50)
HGB BLD-MCNC: 15.1 G/DL — SIGNIFICANT CHANGE UP (ref 13–17)
IMM GRANULOCYTES # BLD AUTO: 0.04 K/UL — SIGNIFICANT CHANGE UP (ref 0–0.07)
IMM GRANULOCYTES NFR BLD AUTO: 0.4 % — SIGNIFICANT CHANGE UP (ref 0–0.9)
LYMPHOCYTES # BLD AUTO: 2.02 K/UL — SIGNIFICANT CHANGE UP (ref 1–3.3)
LYMPHOCYTES NFR BLD AUTO: 18.1 % — SIGNIFICANT CHANGE UP (ref 13–44)
MCHC RBC-ENTMCNC: 28.9 PG — SIGNIFICANT CHANGE UP (ref 27–34)
MCHC RBC-ENTMCNC: 32.1 G/DL — SIGNIFICANT CHANGE UP (ref 32–36)
MCV RBC AUTO: 89.9 FL — SIGNIFICANT CHANGE UP (ref 80–100)
MONOCYTES # BLD AUTO: 0.66 K/UL — SIGNIFICANT CHANGE UP (ref 0–0.9)
MONOCYTES NFR BLD AUTO: 5.9 % — SIGNIFICANT CHANGE UP (ref 2–14)
NEUTROPHILS # BLD AUTO: 8.36 K/UL — HIGH (ref 1.8–7.4)
NEUTROPHILS NFR BLD AUTO: 74.9 % — SIGNIFICANT CHANGE UP (ref 43–77)
NRBC # BLD AUTO: 0 K/UL — SIGNIFICANT CHANGE UP (ref 0–0)
NRBC # FLD: 0 K/UL — SIGNIFICANT CHANGE UP (ref 0–0)
NRBC BLD AUTO-RTO: 0 /100 WBCS — SIGNIFICANT CHANGE UP (ref 0–0)
PLATELET # BLD AUTO: 132 K/UL — LOW (ref 150–400)
PMV BLD: 10.3 FL — SIGNIFICANT CHANGE UP (ref 7–13)
POTASSIUM SERPL-MCNC: 4.9 MMOL/L — SIGNIFICANT CHANGE UP (ref 3.5–5)
POTASSIUM SERPL-SCNC: 4.9 MMOL/L — SIGNIFICANT CHANGE UP (ref 3.5–5)
PROT SERPL-MCNC: 7 G/DL — SIGNIFICANT CHANGE UP (ref 6–8)
RBC # BLD: 5.23 M/UL — SIGNIFICANT CHANGE UP (ref 4.2–5.8)
RBC # FLD: 17 % — HIGH (ref 10.3–14.5)
SODIUM SERPL-SCNC: 137 MMOL/L — SIGNIFICANT CHANGE UP (ref 135–146)
WBC # BLD: 11.16 K/UL — HIGH (ref 3.8–10.5)
WBC # FLD AUTO: 11.16 K/UL — HIGH (ref 3.8–10.5)

## 2025-07-10 PROCEDURE — 87205 SMEAR GRAM STAIN: CPT

## 2025-07-10 PROCEDURE — 87070 CULTURE OTHR SPECIMN AEROBIC: CPT

## 2025-07-10 PROCEDURE — 88304 TISSUE EXAM BY PATHOLOGIST: CPT

## 2025-07-10 PROCEDURE — 99285 EMERGENCY DEPT VISIT HI MDM: CPT

## 2025-07-10 PROCEDURE — 73630 X-RAY EXAM OF FOOT: CPT | Mod: 26,RT

## 2025-07-10 PROCEDURE — 82962 GLUCOSE BLOOD TEST: CPT

## 2025-07-10 PROCEDURE — 87075 CULTR BACTERIA EXCEPT BLOOD: CPT

## 2025-07-10 PROCEDURE — 93970 EXTREMITY STUDY: CPT

## 2025-07-10 PROCEDURE — 88311 DECALCIFY TISSUE: CPT

## 2025-07-10 PROCEDURE — 87077 CULTURE AEROBIC IDENTIFY: CPT

## 2025-07-10 RX ORDER — INSULIN ASPART 100 [IU]/ML
6 INJECTION, SOLUTION INTRAVENOUS; SUBCUTANEOUS
Refills: 0 | DISCHARGE

## 2025-07-10 RX ORDER — GLUCAGON 3 MG/1
1 POWDER NASAL ONCE
Refills: 0 | Status: DISCONTINUED | OUTPATIENT
Start: 2025-07-10 | End: 2025-07-11

## 2025-07-10 RX ORDER — FOLIC ACID 1 MG/1
1 TABLET ORAL DAILY
Refills: 0 | Status: DISCONTINUED | OUTPATIENT
Start: 2025-07-10 | End: 2025-07-11

## 2025-07-10 RX ORDER — SODIUM CHLORIDE 9 G/1000ML
1000 INJECTION, SOLUTION INTRAVENOUS
Refills: 0 | Status: DISCONTINUED | OUTPATIENT
Start: 2025-07-10 | End: 2025-07-11

## 2025-07-10 RX ORDER — MAGNESIUM, ALUMINUM HYDROXIDE 200-200 MG
30 TABLET,CHEWABLE ORAL EVERY 4 HOURS
Refills: 0 | Status: DISCONTINUED | OUTPATIENT
Start: 2025-07-10 | End: 2025-07-11

## 2025-07-10 RX ORDER — NIFEDIPINE 30 MG
1 TABLET, EXTENDED RELEASE 24 HR ORAL
Refills: 0 | DISCHARGE

## 2025-07-10 RX ORDER — METRONIDAZOLE 250 MG
500 TABLET ORAL ONCE
Refills: 0 | Status: COMPLETED | OUTPATIENT
Start: 2025-07-10 | End: 2025-07-10

## 2025-07-10 RX ORDER — RANOLAZINE 1000 MG/1
500 TABLET, FILM COATED, EXTENDED RELEASE ORAL
Refills: 0 | Status: DISCONTINUED | OUTPATIENT
Start: 2025-07-10 | End: 2025-07-11

## 2025-07-10 RX ORDER — METOPROLOL SUCCINATE 50 MG/1
50 TABLET, EXTENDED RELEASE ORAL DAILY
Refills: 0 | Status: DISCONTINUED | OUTPATIENT
Start: 2025-07-10 | End: 2025-07-11

## 2025-07-10 RX ORDER — DEXTROSE 50 % IN WATER 50 %
25 SYRINGE (ML) INTRAVENOUS ONCE
Refills: 0 | Status: DISCONTINUED | OUTPATIENT
Start: 2025-07-10 | End: 2025-07-11

## 2025-07-10 RX ORDER — APIXABAN 2.5 MG/1
5 TABLET, FILM COATED ORAL EVERY 12 HOURS
Refills: 0 | Status: DISCONTINUED | OUTPATIENT
Start: 2025-07-10 | End: 2025-07-11

## 2025-07-10 RX ORDER — DEXTROSE 50 % IN WATER 50 %
12.5 SYRINGE (ML) INTRAVENOUS ONCE
Refills: 0 | Status: DISCONTINUED | OUTPATIENT
Start: 2025-07-10 | End: 2025-07-11

## 2025-07-10 RX ORDER — NIFEDIPINE 30 MG
30 TABLET, EXTENDED RELEASE 24 HR ORAL
Refills: 0 | Status: DISCONTINUED | OUTPATIENT
Start: 2025-07-10 | End: 2025-07-11

## 2025-07-10 RX ORDER — MYCOPHENOLATE MOFETIL 500 MG/1
500 TABLET, FILM COATED ORAL
Refills: 0 | Status: DISCONTINUED | OUTPATIENT
Start: 2025-07-10 | End: 2025-07-11

## 2025-07-10 RX ORDER — ATORVASTATIN CALCIUM 80 MG/1
20 TABLET, FILM COATED ORAL AT BEDTIME
Refills: 0 | Status: DISCONTINUED | OUTPATIENT
Start: 2025-07-10 | End: 2025-07-11

## 2025-07-10 RX ORDER — ASPIRIN 325 MG
81 TABLET ORAL DAILY
Refills: 0 | Status: DISCONTINUED | OUTPATIENT
Start: 2025-07-10 | End: 2025-07-11

## 2025-07-10 RX ORDER — TACROLIMUS 0.5 MG/1
0.5 CAPSULE ORAL
Refills: 0 | Status: DISCONTINUED | OUTPATIENT
Start: 2025-07-10 | End: 2025-07-11

## 2025-07-10 RX ORDER — METRONIDAZOLE 250 MG
500 TABLET ORAL EVERY 12 HOURS
Refills: 0 | Status: DISCONTINUED | OUTPATIENT
Start: 2025-07-11 | End: 2025-07-11

## 2025-07-10 RX ORDER — ONDANSETRON HCL/PF 4 MG/2 ML
4 VIAL (ML) INJECTION EVERY 8 HOURS
Refills: 0 | Status: DISCONTINUED | OUTPATIENT
Start: 2025-07-10 | End: 2025-07-11

## 2025-07-10 RX ORDER — PREDNISONE 20 MG/1
5 TABLET ORAL DAILY
Refills: 0 | Status: DISCONTINUED | OUTPATIENT
Start: 2025-07-10 | End: 2025-07-11

## 2025-07-10 RX ORDER — INSULIN LISPRO 100 U/ML
INJECTION, SOLUTION INTRAVENOUS; SUBCUTANEOUS
Refills: 0 | Status: DISCONTINUED | OUTPATIENT
Start: 2025-07-10 | End: 2025-07-11

## 2025-07-10 RX ORDER — INSULIN LISPRO 100 U/ML
6 INJECTION, SOLUTION INTRAVENOUS; SUBCUTANEOUS
Refills: 0 | Status: DISCONTINUED | OUTPATIENT
Start: 2025-07-10 | End: 2025-07-11

## 2025-07-10 RX ORDER — SERTRALINE 100 MG/1
50 TABLET, FILM COATED ORAL DAILY
Refills: 0 | Status: DISCONTINUED | OUTPATIENT
Start: 2025-07-10 | End: 2025-07-11

## 2025-07-10 RX ORDER — CEFEPIME 2 G/20ML
2000 INJECTION, POWDER, FOR SOLUTION INTRAVENOUS ONCE
Refills: 0 | Status: COMPLETED | OUTPATIENT
Start: 2025-07-10 | End: 2025-07-10

## 2025-07-10 RX ORDER — DEXTROSE 50 % IN WATER 50 %
15 SYRINGE (ML) INTRAVENOUS ONCE
Refills: 0 | Status: DISCONTINUED | OUTPATIENT
Start: 2025-07-10 | End: 2025-07-11

## 2025-07-10 RX ORDER — MELATONIN 5 MG
3 TABLET ORAL AT BEDTIME
Refills: 0 | Status: DISCONTINUED | OUTPATIENT
Start: 2025-07-10 | End: 2025-07-11

## 2025-07-10 RX ORDER — VANCOMYCIN HCL IN 5 % DEXTROSE 1.5G/250ML
1000 PLASTIC BAG, INJECTION (ML) INTRAVENOUS EVERY 12 HOURS
Refills: 0 | Status: DISCONTINUED | OUTPATIENT
Start: 2025-07-11 | End: 2025-07-11

## 2025-07-10 RX ORDER — ACETAMINOPHEN 500 MG/5ML
650 LIQUID (ML) ORAL EVERY 6 HOURS
Refills: 0 | Status: DISCONTINUED | OUTPATIENT
Start: 2025-07-10 | End: 2025-07-11

## 2025-07-10 RX ORDER — INSULIN GLARGINE-YFGN 100 [IU]/ML
18 INJECTION, SOLUTION SUBCUTANEOUS ONCE
Refills: 0 | Status: COMPLETED | OUTPATIENT
Start: 2025-07-10 | End: 2025-07-10

## 2025-07-10 RX ORDER — CEFEPIME 2 G/20ML
2000 INJECTION, POWDER, FOR SOLUTION INTRAVENOUS EVERY 8 HOURS
Refills: 0 | Status: DISCONTINUED | OUTPATIENT
Start: 2025-07-10 | End: 2025-07-11

## 2025-07-10 RX ORDER — VANCOMYCIN HCL IN 5 % DEXTROSE 1.5G/250ML
1000 PLASTIC BAG, INJECTION (ML) INTRAVENOUS ONCE
Refills: 0 | Status: COMPLETED | OUTPATIENT
Start: 2025-07-10 | End: 2025-07-10

## 2025-07-10 RX ORDER — ISOSORBIDE MONONITRATE 60 MG/1
30 TABLET, EXTENDED RELEASE ORAL DAILY
Refills: 0 | Status: DISCONTINUED | OUTPATIENT
Start: 2025-07-10 | End: 2025-07-11

## 2025-07-10 RX ORDER — INSULIN GLARGINE-YFGN 100 [IU]/ML
35 INJECTION, SOLUTION SUBCUTANEOUS AT BEDTIME
Refills: 0 | Status: DISCONTINUED | OUTPATIENT
Start: 2025-07-10 | End: 2025-07-11

## 2025-07-10 RX ADMIN — Medication 100 MILLIGRAM(S): at 15:15

## 2025-07-10 RX ADMIN — Medication 250 MILLIGRAM(S): at 16:22

## 2025-07-10 RX ADMIN — CEFEPIME 100 MILLIGRAM(S): 2 INJECTION, POWDER, FOR SOLUTION INTRAVENOUS at 21:17

## 2025-07-10 RX ADMIN — CEFEPIME 100 MILLIGRAM(S): 2 INJECTION, POWDER, FOR SOLUTION INTRAVENOUS at 15:00

## 2025-07-10 RX ADMIN — RANOLAZINE 500 MILLIGRAM(S): 1000 TABLET, FILM COATED, EXTENDED RELEASE ORAL at 18:20

## 2025-07-10 RX ADMIN — INSULIN LISPRO 6 UNIT(S): 100 INJECTION, SOLUTION INTRAVENOUS; SUBCUTANEOUS at 18:29

## 2025-07-10 RX ADMIN — MYCOPHENOLATE MOFETIL 500 MILLIGRAM(S): 500 TABLET, FILM COATED ORAL at 18:19

## 2025-07-10 RX ADMIN — INSULIN LISPRO 2: 100 INJECTION, SOLUTION INTRAVENOUS; SUBCUTANEOUS at 18:30

## 2025-07-10 RX ADMIN — INSULIN GLARGINE-YFGN 18 UNIT(S): 100 INJECTION, SOLUTION SUBCUTANEOUS at 22:02

## 2025-07-10 RX ADMIN — Medication 30 MILLIGRAM(S): at 18:20

## 2025-07-10 RX ADMIN — Medication 3 MILLIGRAM(S): at 22:04

## 2025-07-10 RX ADMIN — ATORVASTATIN CALCIUM 20 MILLIGRAM(S): 80 TABLET, FILM COATED ORAL at 21:16

## 2025-07-10 RX ADMIN — TACROLIMUS 0.5 MILLIGRAM(S): 0.5 CAPSULE ORAL at 20:05

## 2025-07-11 ENCOUNTER — RESULT REVIEW (OUTPATIENT)
Age: 75
End: 2025-07-11

## 2025-07-11 LAB
GLUCOSE BLDC GLUCOMTR-MCNC: 117 MG/DL — HIGH (ref 70–99)
GLUCOSE BLDC GLUCOMTR-MCNC: 124 MG/DL — HIGH (ref 70–99)
GLUCOSE BLDC GLUCOMTR-MCNC: 126 MG/DL — HIGH (ref 70–99)

## 2025-07-11 PROCEDURE — 93970 EXTREMITY STUDY: CPT | Mod: 26

## 2025-07-11 PROCEDURE — 88311 DECALCIFY TISSUE: CPT | Mod: 26

## 2025-07-11 PROCEDURE — 88304 TISSUE EXAM BY PATHOLOGIST: CPT | Mod: 26

## 2025-07-11 PROCEDURE — 99233 SBSQ HOSP IP/OBS HIGH 50: CPT

## 2025-07-11 RX ORDER — INSULIN LISPRO 100 U/ML
INJECTION, SOLUTION INTRAVENOUS; SUBCUTANEOUS
Refills: 0 | Status: DISCONTINUED | OUTPATIENT
Start: 2025-07-11 | End: 2025-07-12

## 2025-07-11 RX ORDER — TACROLIMUS 0.5 MG/1
0.5 CAPSULE ORAL
Refills: 0 | Status: DISCONTINUED | OUTPATIENT
Start: 2025-07-11 | End: 2025-07-12

## 2025-07-11 RX ORDER — DEXTROSE 50 % IN WATER 50 %
25 SYRINGE (ML) INTRAVENOUS ONCE
Refills: 0 | Status: DISCONTINUED | OUTPATIENT
Start: 2025-07-11 | End: 2025-07-12

## 2025-07-11 RX ORDER — MAGNESIUM, ALUMINUM HYDROXIDE 200-200 MG
30 TABLET,CHEWABLE ORAL EVERY 4 HOURS
Refills: 0 | Status: DISCONTINUED | OUTPATIENT
Start: 2025-07-11 | End: 2025-07-12

## 2025-07-11 RX ORDER — METOPROLOL SUCCINATE 50 MG/1
50 TABLET, EXTENDED RELEASE ORAL DAILY
Refills: 0 | Status: DISCONTINUED | OUTPATIENT
Start: 2025-07-11 | End: 2025-07-12

## 2025-07-11 RX ORDER — RANOLAZINE 1000 MG/1
500 TABLET, FILM COATED, EXTENDED RELEASE ORAL
Refills: 0 | Status: DISCONTINUED | OUTPATIENT
Start: 2025-07-11 | End: 2025-07-12

## 2025-07-11 RX ORDER — MYCOPHENOLATE MOFETIL 500 MG/1
500 TABLET, FILM COATED ORAL
Refills: 0 | Status: DISCONTINUED | OUTPATIENT
Start: 2025-07-11 | End: 2025-07-12

## 2025-07-11 RX ORDER — APIXABAN 2.5 MG/1
5 TABLET, FILM COATED ORAL EVERY 12 HOURS
Refills: 0 | Status: DISCONTINUED | OUTPATIENT
Start: 2025-07-11 | End: 2025-07-12

## 2025-07-11 RX ORDER — MELATONIN 5 MG
3 TABLET ORAL AT BEDTIME
Refills: 0 | Status: DISCONTINUED | OUTPATIENT
Start: 2025-07-11 | End: 2025-07-12

## 2025-07-11 RX ORDER — NIFEDIPINE 30 MG
30 TABLET, EXTENDED RELEASE 24 HR ORAL
Refills: 0 | Status: DISCONTINUED | OUTPATIENT
Start: 2025-07-11 | End: 2025-07-12

## 2025-07-11 RX ORDER — DEXTROSE 50 % IN WATER 50 %
15 SYRINGE (ML) INTRAVENOUS ONCE
Refills: 0 | Status: DISCONTINUED | OUTPATIENT
Start: 2025-07-11 | End: 2025-07-12

## 2025-07-11 RX ORDER — GLUCAGON 3 MG/1
1 POWDER NASAL ONCE
Refills: 0 | Status: DISCONTINUED | OUTPATIENT
Start: 2025-07-11 | End: 2025-07-12

## 2025-07-11 RX ORDER — ACETAMINOPHEN 500 MG/5ML
650 LIQUID (ML) ORAL EVERY 6 HOURS
Refills: 0 | Status: DISCONTINUED | OUTPATIENT
Start: 2025-07-11 | End: 2025-07-12

## 2025-07-11 RX ORDER — SERTRALINE 100 MG/1
50 TABLET, FILM COATED ORAL DAILY
Refills: 0 | Status: DISCONTINUED | OUTPATIENT
Start: 2025-07-11 | End: 2025-07-12

## 2025-07-11 RX ORDER — VANCOMYCIN HCL IN 5 % DEXTROSE 1.5G/250ML
1000 PLASTIC BAG, INJECTION (ML) INTRAVENOUS EVERY 12 HOURS
Refills: 0 | Status: DISCONTINUED | OUTPATIENT
Start: 2025-07-11 | End: 2025-07-12

## 2025-07-11 RX ORDER — ASPIRIN 325 MG
81 TABLET ORAL DAILY
Refills: 0 | Status: DISCONTINUED | OUTPATIENT
Start: 2025-07-11 | End: 2025-07-12

## 2025-07-11 RX ORDER — VANCOMYCIN HCL IN 5 % DEXTROSE 1.5G/250ML
750 PLASTIC BAG, INJECTION (ML) INTRAVENOUS EVERY 12 HOURS
Refills: 0 | Status: DISCONTINUED | OUTPATIENT
Start: 2025-07-11 | End: 2025-07-11

## 2025-07-11 RX ORDER — FOLIC ACID 1 MG/1
1 TABLET ORAL DAILY
Refills: 0 | Status: DISCONTINUED | OUTPATIENT
Start: 2025-07-11 | End: 2025-07-12

## 2025-07-11 RX ORDER — SODIUM CHLORIDE 9 G/1000ML
1000 INJECTION, SOLUTION INTRAVENOUS
Refills: 0 | Status: DISCONTINUED | OUTPATIENT
Start: 2025-07-11 | End: 2025-07-12

## 2025-07-11 RX ORDER — INSULIN LISPRO 100 U/ML
6 INJECTION, SOLUTION INTRAVENOUS; SUBCUTANEOUS
Refills: 0 | Status: DISCONTINUED | OUTPATIENT
Start: 2025-07-11 | End: 2025-07-12

## 2025-07-11 RX ORDER — ISOSORBIDE MONONITRATE 60 MG/1
30 TABLET, EXTENDED RELEASE ORAL DAILY
Refills: 0 | Status: DISCONTINUED | OUTPATIENT
Start: 2025-07-11 | End: 2025-07-12

## 2025-07-11 RX ORDER — PREDNISONE 20 MG/1
5 TABLET ORAL DAILY
Refills: 0 | Status: DISCONTINUED | OUTPATIENT
Start: 2025-07-11 | End: 2025-07-12

## 2025-07-11 RX ORDER — CEFEPIME 2 G/20ML
2000 INJECTION, POWDER, FOR SOLUTION INTRAVENOUS EVERY 8 HOURS
Refills: 0 | Status: DISCONTINUED | OUTPATIENT
Start: 2025-07-11 | End: 2025-07-12

## 2025-07-11 RX ORDER — INSULIN GLARGINE-YFGN 100 [IU]/ML
35 INJECTION, SOLUTION SUBCUTANEOUS AT BEDTIME
Refills: 0 | Status: DISCONTINUED | OUTPATIENT
Start: 2025-07-11 | End: 2025-07-12

## 2025-07-11 RX ORDER — METRONIDAZOLE 250 MG
500 TABLET ORAL EVERY 12 HOURS
Refills: 0 | Status: DISCONTINUED | OUTPATIENT
Start: 2025-07-11 | End: 2025-07-12

## 2025-07-11 RX ORDER — ONDANSETRON HCL/PF 4 MG/2 ML
4 VIAL (ML) INJECTION EVERY 8 HOURS
Refills: 0 | Status: DISCONTINUED | OUTPATIENT
Start: 2025-07-11 | End: 2025-07-12

## 2025-07-11 RX ORDER — ATORVASTATIN CALCIUM 80 MG/1
20 TABLET, FILM COATED ORAL AT BEDTIME
Refills: 0 | Status: DISCONTINUED | OUTPATIENT
Start: 2025-07-11 | End: 2025-07-12

## 2025-07-11 RX ADMIN — METOPROLOL SUCCINATE 50 MILLIGRAM(S): 50 TABLET, EXTENDED RELEASE ORAL at 22:43

## 2025-07-11 RX ADMIN — CEFEPIME 100 MILLIGRAM(S): 2 INJECTION, POWDER, FOR SOLUTION INTRAVENOUS at 05:46

## 2025-07-11 RX ADMIN — CEFEPIME 100 MILLIGRAM(S): 2 INJECTION, POWDER, FOR SOLUTION INTRAVENOUS at 23:18

## 2025-07-11 RX ADMIN — Medication 1 APPLICATION(S): at 12:41

## 2025-07-11 RX ADMIN — Medication 100 MILLIGRAM(S): at 05:49

## 2025-07-11 RX ADMIN — ISOSORBIDE MONONITRATE 30 MILLIGRAM(S): 60 TABLET, EXTENDED RELEASE ORAL at 12:37

## 2025-07-11 RX ADMIN — TACROLIMUS 0.5 MILLIGRAM(S): 0.5 CAPSULE ORAL at 22:43

## 2025-07-11 RX ADMIN — MYCOPHENOLATE MOFETIL 500 MILLIGRAM(S): 500 TABLET, FILM COATED ORAL at 17:11

## 2025-07-11 RX ADMIN — RANOLAZINE 500 MILLIGRAM(S): 1000 TABLET, FILM COATED, EXTENDED RELEASE ORAL at 17:11

## 2025-07-11 RX ADMIN — INSULIN GLARGINE-YFGN 35 UNIT(S): 100 INJECTION, SOLUTION SUBCUTANEOUS at 22:47

## 2025-07-11 RX ADMIN — Medication 250 MILLIGRAM(S): at 05:40

## 2025-07-11 RX ADMIN — FOLIC ACID 1 MILLIGRAM(S): 1 TABLET ORAL at 12:36

## 2025-07-11 RX ADMIN — ISOSORBIDE MONONITRATE 30 MILLIGRAM(S): 60 TABLET, EXTENDED RELEASE ORAL at 22:42

## 2025-07-11 RX ADMIN — ATORVASTATIN CALCIUM 20 MILLIGRAM(S): 80 TABLET, FILM COATED ORAL at 22:43

## 2025-07-11 RX ADMIN — Medication 650 MILLIGRAM(S): at 12:35

## 2025-07-11 RX ADMIN — Medication 30 MILLIGRAM(S): at 17:12

## 2025-07-11 RX ADMIN — Medication 30 MILLIGRAM(S): at 22:42

## 2025-07-11 RX ADMIN — Medication 650 MILLIGRAM(S): at 12:51

## 2025-07-11 RX ADMIN — TACROLIMUS 0.5 MILLIGRAM(S): 0.5 CAPSULE ORAL at 09:00

## 2025-07-11 RX ADMIN — SERTRALINE 50 MILLIGRAM(S): 100 TABLET, FILM COATED ORAL at 12:37

## 2025-07-12 ENCOUNTER — TRANSCRIPTION ENCOUNTER (OUTPATIENT)
Age: 75
End: 2025-07-12

## 2025-07-12 VITALS
DIASTOLIC BLOOD PRESSURE: 69 MMHG | RESPIRATION RATE: 20 BRPM | SYSTOLIC BLOOD PRESSURE: 129 MMHG | HEART RATE: 74 BPM | OXYGEN SATURATION: 100 % | TEMPERATURE: 98 F

## 2025-07-12 LAB
GLUCOSE BLDC GLUCOMTR-MCNC: 142 MG/DL — HIGH (ref 70–99)
GLUCOSE BLDC GLUCOMTR-MCNC: 152 MG/DL — HIGH (ref 70–99)
GLUCOSE BLDC GLUCOMTR-MCNC: 209 MG/DL — HIGH (ref 70–99)

## 2025-07-12 PROCEDURE — 99233 SBSQ HOSP IP/OBS HIGH 50: CPT

## 2025-07-12 PROCEDURE — G0545: CPT

## 2025-07-12 RX ORDER — LEVOFLOXACIN 25 MG/ML
1 SOLUTION ORAL
Qty: 14 | Refills: 0
Start: 2025-07-12 | End: 2025-07-25

## 2025-07-12 RX ORDER — METOPROLOL SUCCINATE 50 MG/1
1 TABLET, EXTENDED RELEASE ORAL
Refills: 0 | DISCHARGE

## 2025-07-12 RX ORDER — METOPROLOL SUCCINATE 50 MG/1
1 TABLET, EXTENDED RELEASE ORAL
Qty: 0 | Refills: 0 | DISCHARGE
Start: 2025-07-12

## 2025-07-12 RX ORDER — DOXYCYCLINE HYCLATE 100 MG
1 TABLET ORAL
Qty: 28 | Refills: 0
Start: 2025-07-12 | End: 2025-07-25

## 2025-07-12 RX ADMIN — RANOLAZINE 500 MILLIGRAM(S): 1000 TABLET, FILM COATED, EXTENDED RELEASE ORAL at 05:31

## 2025-07-12 RX ADMIN — Medication 250 MILLIGRAM(S): at 05:30

## 2025-07-12 RX ADMIN — INSULIN LISPRO 6 UNIT(S): 100 INJECTION, SOLUTION INTRAVENOUS; SUBCUTANEOUS at 12:08

## 2025-07-12 RX ADMIN — METOPROLOL SUCCINATE 50 MILLIGRAM(S): 50 TABLET, EXTENDED RELEASE ORAL at 05:32

## 2025-07-12 RX ADMIN — ISOSORBIDE MONONITRATE 30 MILLIGRAM(S): 60 TABLET, EXTENDED RELEASE ORAL at 12:07

## 2025-07-12 RX ADMIN — APIXABAN 5 MILLIGRAM(S): 2.5 TABLET, FILM COATED ORAL at 05:31

## 2025-07-12 RX ADMIN — Medication 650 MILLIGRAM(S): at 14:17

## 2025-07-12 RX ADMIN — PREDNISONE 5 MILLIGRAM(S): 20 TABLET ORAL at 05:31

## 2025-07-12 RX ADMIN — SERTRALINE 50 MILLIGRAM(S): 100 TABLET, FILM COATED ORAL at 12:07

## 2025-07-12 RX ADMIN — Medication 650 MILLIGRAM(S): at 15:47

## 2025-07-12 RX ADMIN — Medication 30 MILLIGRAM(S): at 05:32

## 2025-07-12 RX ADMIN — INSULIN LISPRO 6 UNIT(S): 100 INJECTION, SOLUTION INTRAVENOUS; SUBCUTANEOUS at 08:03

## 2025-07-12 RX ADMIN — MYCOPHENOLATE MOFETIL 500 MILLIGRAM(S): 500 TABLET, FILM COATED ORAL at 05:32

## 2025-07-12 RX ADMIN — Medication 3 MILLIGRAM(S): at 00:18

## 2025-07-12 RX ADMIN — Medication 81 MILLIGRAM(S): at 12:07

## 2025-07-12 RX ADMIN — Medication 650 MILLIGRAM(S): at 08:32

## 2025-07-12 RX ADMIN — Medication 650 MILLIGRAM(S): at 08:02

## 2025-07-12 RX ADMIN — TACROLIMUS 0.5 MILLIGRAM(S): 0.5 CAPSULE ORAL at 08:03

## 2025-07-12 RX ADMIN — FOLIC ACID 1 MILLIGRAM(S): 1 TABLET ORAL at 12:07

## 2025-07-12 RX ADMIN — CEFEPIME 100 MILLIGRAM(S): 2 INJECTION, POWDER, FOR SOLUTION INTRAVENOUS at 05:30

## 2025-07-12 RX ADMIN — Medication 100 MILLIGRAM(S): at 05:30

## 2025-07-12 RX ADMIN — INSULIN LISPRO 2: 100 INJECTION, SOLUTION INTRAVENOUS; SUBCUTANEOUS at 12:08

## 2025-07-13 LAB
GRAM STN FLD: SIGNIFICANT CHANGE UP
SPECIMEN SOURCE: SIGNIFICANT CHANGE UP

## 2025-07-15 LAB
CULTURE RESULTS: SIGNIFICANT CHANGE UP
CULTURE RESULTS: SIGNIFICANT CHANGE UP
SPECIMEN SOURCE: SIGNIFICANT CHANGE UP
SPECIMEN SOURCE: SIGNIFICANT CHANGE UP

## 2025-07-16 LAB — SURGICAL PATHOLOGY STUDY: SIGNIFICANT CHANGE UP

## 2025-07-17 DIAGNOSIS — I25.10 ATHEROSCLEROTIC HEART DISEASE OF NATIVE CORONARY ARTERY WITHOUT ANGINA PECTORIS: ICD-10-CM

## 2025-07-17 DIAGNOSIS — Z94.0 KIDNEY TRANSPLANT STATUS: ICD-10-CM

## 2025-07-17 DIAGNOSIS — Z95.810 PRESENCE OF AUTOMATIC (IMPLANTABLE) CARDIAC DEFIBRILLATOR: ICD-10-CM

## 2025-07-17 DIAGNOSIS — Z95.1 PRESENCE OF AORTOCORONARY BYPASS GRAFT: ICD-10-CM

## 2025-07-17 DIAGNOSIS — L97.519 NON-PRESSURE CHRONIC ULCER OF OTHER PART OF RIGHT FOOT WITH UNSPECIFIED SEVERITY: ICD-10-CM

## 2025-07-17 DIAGNOSIS — Z89.421 ACQUIRED ABSENCE OF OTHER RIGHT TOE(S): ICD-10-CM

## 2025-07-17 DIAGNOSIS — E11.621 TYPE 2 DIABETES MELLITUS WITH FOOT ULCER: ICD-10-CM
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
## 2025-07-17 DIAGNOSIS — R60.0 LOCALIZED EDEMA: ICD-10-CM

## 2025-07-17 DIAGNOSIS — Z79.4 LONG TERM (CURRENT) USE OF INSULIN: ICD-10-CM

## 2025-07-17 DIAGNOSIS — E66.9 OBESITY, UNSPECIFIED: ICD-10-CM

## 2025-07-17 DIAGNOSIS — Z79.01 LONG TERM (CURRENT) USE OF ANTICOAGULANTS: ICD-10-CM

## 2025-07-17 DIAGNOSIS — Z89.422 ACQUIRED ABSENCE OF OTHER LEFT TOE(S): ICD-10-CM

## 2025-07-17 DIAGNOSIS — T87.43 INFECTION OF AMPUTATION STUMP, RIGHT LOWER EXTREMITY: ICD-10-CM

## 2025-07-17 DIAGNOSIS — M86.171 OTHER ACUTE OSTEOMYELITIS, RIGHT ANKLE AND FOOT: ICD-10-CM

## 2025-07-17 DIAGNOSIS — D84.81 IMMUNODEFICIENCY DUE TO CONDITIONS CLASSIFIED ELSEWHERE: ICD-10-CM

## 2025-07-17 DIAGNOSIS — E11.51 TYPE 2 DIABETES MELLITUS WITH DIABETIC PERIPHERAL ANGIOPATHY WITHOUT GANGRENE: ICD-10-CM
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
## 2025-07-17 DIAGNOSIS — I10 ESSENTIAL (PRIMARY) HYPERTENSION: ICD-10-CM

## 2025-07-17 DIAGNOSIS — Z79.82 LONG TERM (CURRENT) USE OF ASPIRIN: ICD-10-CM

## 2025-07-17 DIAGNOSIS — Z79.85 LONG-TERM (CURRENT) USE OF INJECTABLE NON-INSULIN ANTIDIABETIC DRUGS: ICD-10-CM

## 2025-07-17 DIAGNOSIS — E11.69 TYPE 2 DIABETES MELLITUS WITH OTHER SPECIFIED COMPLICATION: ICD-10-CM

## 2025-07-17 LAB
CULTURE RESULTS: ABNORMAL
GRAM STN FLD: ABNORMAL
SPECIMEN SOURCE: SIGNIFICANT CHANGE UP

## 2025-08-11 ENCOUNTER — APPOINTMENT (OUTPATIENT)
Dept: GASTROENTEROLOGY | Facility: CLINIC | Age: 75
End: 2025-08-11